# Patient Record
Sex: FEMALE | Race: OTHER | NOT HISPANIC OR LATINO
[De-identification: names, ages, dates, MRNs, and addresses within clinical notes are randomized per-mention and may not be internally consistent; named-entity substitution may affect disease eponyms.]

---

## 2018-12-03 PROBLEM — Z00.00 ENCOUNTER FOR PREVENTIVE HEALTH EXAMINATION: Status: ACTIVE | Noted: 2018-12-03

## 2021-08-20 ENCOUNTER — RESULT REVIEW (OUTPATIENT)
Age: 56
End: 2021-08-20

## 2022-12-20 ENCOUNTER — APPOINTMENT (OUTPATIENT)
Age: 57
End: 2022-12-20

## 2022-12-20 DIAGNOSIS — N92.1 EXCESSIVE AND FREQUENT MENSTRUATION WITH IRREGULAR CYCLE: ICD-10-CM

## 2022-12-20 DIAGNOSIS — Z78.9 OTHER SPECIFIED HEALTH STATUS: ICD-10-CM

## 2022-12-20 DIAGNOSIS — M54.50 LOW BACK PAIN, UNSPECIFIED: ICD-10-CM

## 2022-12-20 DIAGNOSIS — R93.5 ABNORMAL FINDINGS ON DIAGNOSTIC IMAGING OF OTHER ABDOMINAL REGIONS, INCLUDING RETROPERITONEUM: ICD-10-CM

## 2022-12-20 DIAGNOSIS — N13.30 UNSPECIFIED HYDRONEPHROSIS: ICD-10-CM

## 2022-12-20 DIAGNOSIS — R35.0 FREQUENCY OF MICTURITION: ICD-10-CM

## 2022-12-20 DIAGNOSIS — D64.9 ANEMIA, UNSPECIFIED: ICD-10-CM

## 2022-12-20 DIAGNOSIS — N39.3 STRESS INCONTINENCE (FEMALE) (MALE): ICD-10-CM

## 2022-12-20 DIAGNOSIS — J30.2 OTHER SEASONAL ALLERGIC RHINITIS: ICD-10-CM

## 2022-12-20 PROCEDURE — 99204 OFFICE O/P NEW MOD 45 MIN: CPT | Mod: 95

## 2022-12-20 RX ORDER — CHLORHEXIDINE GLUCONATE 4 %
325 (65 FE) LIQUID (ML) TOPICAL
Refills: 0 | Status: ACTIVE | COMMUNITY

## 2022-12-20 RX ORDER — TURMERIC ROOT EXTRACT 500 MG
TABLET ORAL
Refills: 0 | Status: ACTIVE | COMMUNITY

## 2022-12-20 RX ORDER — OMEGA-3/DHA/EPA/FISH OIL 300-1000MG
CAPSULE ORAL
Refills: 0 | Status: ACTIVE | COMMUNITY

## 2022-12-27 ENCOUNTER — OUTPATIENT (OUTPATIENT)
Dept: OUTPATIENT SERVICES | Facility: HOSPITAL | Age: 57
LOS: 1 days | End: 2022-12-27

## 2022-12-27 VITALS
TEMPERATURE: 98 F | RESPIRATION RATE: 16 BRPM | OXYGEN SATURATION: 100 % | HEIGHT: 62.5 IN | DIASTOLIC BLOOD PRESSURE: 85 MMHG | WEIGHT: 186.07 LBS | SYSTOLIC BLOOD PRESSURE: 126 MMHG | HEART RATE: 67 BPM

## 2022-12-27 DIAGNOSIS — Z98.890 OTHER SPECIFIED POSTPROCEDURAL STATES: Chronic | ICD-10-CM

## 2022-12-27 DIAGNOSIS — D25.1 INTRAMURAL LEIOMYOMA OF UTERUS: ICD-10-CM

## 2022-12-27 DIAGNOSIS — Z92.89 PERSONAL HISTORY OF OTHER MEDICAL TREATMENT: Chronic | ICD-10-CM

## 2022-12-27 DIAGNOSIS — D25.9 LEIOMYOMA OF UTERUS, UNSPECIFIED: ICD-10-CM

## 2022-12-27 LAB
ANION GAP SERPL CALC-SCNC: 11 MMOL/L — SIGNIFICANT CHANGE UP (ref 7–14)
BUN SERPL-MCNC: 12 MG/DL — SIGNIFICANT CHANGE UP (ref 7–23)
CALCIUM SERPL-MCNC: 9.3 MG/DL — SIGNIFICANT CHANGE UP (ref 8.4–10.5)
CHLORIDE SERPL-SCNC: 107 MMOL/L — SIGNIFICANT CHANGE UP (ref 98–107)
CO2 SERPL-SCNC: 25 MMOL/L — SIGNIFICANT CHANGE UP (ref 22–31)
CREAT SERPL-MCNC: 0.68 MG/DL — SIGNIFICANT CHANGE UP (ref 0.5–1.3)
EGFR: 103 ML/MIN/1.73M2 — SIGNIFICANT CHANGE UP
GLUCOSE SERPL-MCNC: 82 MG/DL — SIGNIFICANT CHANGE UP (ref 70–99)
HCG SERPL-ACNC: <5 MIU/ML — SIGNIFICANT CHANGE UP
HCT VFR BLD CALC: 34.2 % — LOW (ref 34.5–45)
HGB BLD-MCNC: 9.9 G/DL — LOW (ref 11.5–15.5)
MCHC RBC-ENTMCNC: 22.1 PG — LOW (ref 27–34)
MCHC RBC-ENTMCNC: 28.9 GM/DL — LOW (ref 32–36)
MCV RBC AUTO: 76.3 FL — LOW (ref 80–100)
NRBC # BLD: 0 /100 WBCS — SIGNIFICANT CHANGE UP (ref 0–0)
NRBC # FLD: 0 K/UL — SIGNIFICANT CHANGE UP (ref 0–0)
PLATELET # BLD AUTO: 316 K/UL — SIGNIFICANT CHANGE UP (ref 150–400)
POTASSIUM SERPL-MCNC: 4.8 MMOL/L — SIGNIFICANT CHANGE UP (ref 3.5–5.3)
POTASSIUM SERPL-SCNC: 4.8 MMOL/L — SIGNIFICANT CHANGE UP (ref 3.5–5.3)
RBC # BLD: 4.48 M/UL — SIGNIFICANT CHANGE UP (ref 3.8–5.2)
RBC # FLD: 16.6 % — HIGH (ref 10.3–14.5)
SODIUM SERPL-SCNC: 143 MMOL/L — SIGNIFICANT CHANGE UP (ref 135–145)
WBC # BLD: 2.75 K/UL — LOW (ref 3.8–10.5)
WBC # FLD AUTO: 2.75 K/UL — LOW (ref 3.8–10.5)

## 2022-12-27 RX ORDER — SODIUM CHLORIDE 9 MG/ML
1000 INJECTION, SOLUTION INTRAVENOUS
Refills: 0 | Status: DISCONTINUED | OUTPATIENT
Start: 2023-01-04 | End: 2023-01-18

## 2022-12-27 NOTE — H&P PST ADULT - GENITOURINARY COMMENTS
hx of fibroids, abnormal vaginal bleeding between menstruation Dx with intramural leiomyoma of uterus. Pt scheduled for D&C Hysteroscopy with myosure not examined

## 2022-12-27 NOTE — H&P PST ADULT - WILL THE PATIENT ACCEPT THE PFIZER COVID-19 VACCINE IF ELIGIBLE AND IT IS AVAILABLE?
June 7, 2022       Jean Pierre Lowry MD  [address] [facility] [address]  Via In Basket      Patient: Patsy Urias   YOB: 1981   Date of Visit: 6/7/2022       Dear Dr. Lowry:    Thank you for referring Patsy Urias to me for evaluation. Below are my notes for this visit with her.    If you have questions, please do not hesitate to call me. I look forward to following your patient along with you.      Sincerely,        Desi Sams CNP        CC: No Recipients  Desi Sams CNP  6/7/2022 12:15 PM  Signed         SLEEP EVALUATION NOTE     Patsy Urias is a 40 year old female presenting for evaluation of: Office Visit and Consultation   .    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 Pt indicates that her PCP is trying to rule out many causes for her symptoms and sleep apnea was on the list. She indicates a history of PTSD, premenstrual mood disorder, and post partum depression. She also indicates that her  tells her she goes through a 3 part breathing pattern throughout the night. Stage one includes breathing heavily (like Darth Lincoln), stage two includes stopping breathing, and stage three includes a dripping sound (like a water faucet). Pt does indicate that she has nasal issues and it could be post nasal drip that her  is hearing.    Furthermore, pt indicates that her dad had events of witnessed apneas as well as dyspneic arousals and her mom indicated that the pt as a child would have similar events but did not think anything of it. She also indicates that her mom states snoring but her  denies. She also has vivid dreams and wakes up having panic attacks. She needs to feel comforted at night and will sleep with a pillow over her head when her anxiety is really bad.       Bedtime: typically around 2200/2230. She will  however fall asleep around 1900 when she puts her daughter down to sleep and will then wake up around 2000/2100 and finish her tasks until about 2200/2230.   Awake time: 0600/0630 during school year and summer around 0700/0800.  Sleep position: bilateral sides and likes pillow over the head - linked to anxiety   Sleep onset latency: within 20-30 min   Sleeping aid medications: No  Awakenings # and episodes of nocturia: 1-3x - nocturia and no difficulty returning to sleep.     [x]Snoring  [x]Witnessed apneas  [x]Dyspneic arousal - has in the past not sure if it was related to a panic attack.   [x]Morning dry mouth - occasional   []Morning headache    [x]Non-restorative sleep  [x]Excessive daytime sleepiness or tired during the day  [x]Naps - occasional     []Urge to move legs and/or uncomfortable tingling or burning in legs  []Cramping or jerking of the legs during sleep    []Cataplexy  []Sleep paralysis  []Hypnogognic or hypnopompnic hallucinations  []Sleep attacks    []Parasomnias:    STOP BANG SCREENING  STOP  S: Do you snore loudly?: Yes  T: Do you often feel tired, fatigued or sleepy during daytime?: Yes  O: Has anyone observed you stop breathing during your sleep?: Yes  P: Do you have or are you being treated for high blood pressure?: No  BANG  B: BMI more than 35 kg/m2?: No  A: Is age over 50 years old?: No  N: Neck circumfrence >16 inches (40 cm)?: No  G: Is gender Male?: No  Total Score  Stop Bang Total Score (out of 8): 3    PRIOR SLEEP STUDY DATE:   RESULTS: 1. AHI:                   2. DESATURATION GALILEA:                 CURRENT CO-MORBIDITIES:    [] HTN     [] AF/ARRHYTHMIA    [] HYPOTHYROIDISM     [] CAD     [] OBESITY                  [] INSOMNIA  [] CHF     [] DM                         [] STROKE/TIA  [x]OTHER: palpitations, seasonal allergic rhinitis, Vitamin D deficiency, IBS, h/o migraine, hypersomnia, asthma         Sheldon Sleepiness Scale:     0 = would never doze  1 = slight chance of dozing  2  = moderate chance of dozing  3 = high chance of dozing    Situation     Chance of Dozing       1. Sitting and reading   1  2. Watching TV    2  3. Sitting, inactive in a public place       (e.g. a theatre or a meeting)  0    4. As a passenger in a car for an hour       without a break    1  5. Lying down to rest in the afternoon      when circumstances permit  3  6. Sitting and talking to someone  0    7. Sitting quietly after lunch without      Alcohol     0    8. In a car, while stopped for a few      Minutes in traffic    0            TOTAL: 7      Past Medical History:     Past Medical History:   Diagnosis Date   • Allergy     seasonal   • IBS (irritable bowel syndrome)    • RAD (reactive airway disease)     seasonal asthma   • TMJ tenderness      Past Surgical History:   Procedure Laterality Date   •  section, low transverse     • Myomectomy     • Tonsillectomy       History reviewed. No pertinent family history.  Social History     Tobacco Use   • Smoking status: Never Smoker   • Smokeless tobacco: Never Used   Vaping Use   • Vaping Use: never used   Substance Use Topics   • Alcohol use: Never   • Drug use: Never       ALLERGIES:   Allergen Reactions   • Albuterol Palpitations     Anxiety,dizzy,nausea   • Naproxen Other (See Comments)     abd pain     Current Outpatient Medications   Medication Sig   • escitalopram (LEXAPRO) 20 MG tablet TAKE 1 TABLET BY MOUTH DAILY   • Breo Ellipta 200-25 MCG/INH inhaler INHALE 1 PUFF INTO THE LUNGS DAILY   • Multiple Vitamins-Minerals (MULTIVITAMIN ADULT EXTRA C PO) multivitamin   • levalbuterol (Xopenex HFA) 45 MCG/ACT inhaler Inhale 1-2 puffs into the lungs every 6 hours as needed for Wheezing or Shortness of Breath.   • Cholecalciferol (VITAMIN D) 2000 units capsule Take 2,000 Units by mouth daily.   • Calcium Carb-Cholecalciferol (calcium carbonate-cholecalciferol) 500-100 MG-UNIT chewable tablet Calcium 500   • dicyclomine (BENTYL) 20 MG tablet TAKE 1 TABLET BY  MOUTH FOUR TIMES DAILY AS NEEDED FOR STOMACH CRAMPS   • loratadine (CLARITIN) 10 MG tablet Take 10 mg by mouth daily.     No current facility-administered medications for this visit.          Review of Systems:     Review of Systems   Constitutional: Negative.   HENT: Negative.    Eyes: Negative.    Cardiovascular: Negative.    Respiratory: Positive for sleep disturbances due to breathing and snoring.    Endocrine: Negative.    Hematologic/Lymphatic: Negative.    Skin: Negative.    Musculoskeletal: Negative.    Gastrointestinal: Negative.    Genitourinary: Positive for nocturia.   Neurological: Positive for excessive daytime sleepiness.   Psychiatric/Behavioral: Negative.    Allergic/Immunologic: Negative.         Physical Examination:     Vitals:    06/07/22 1132   BP: 103/65   Pulse: 75   Resp: 18   Temp: 96.8 °F (36 °C)   SpO2: 99%   Weight: 70.3 kg (155 lb)   Height: 5' 9\" (1.753 m)   LMP: 11/10/2021     Body mass index is 22.89 kg/m².      Neck Circumference: 12.75 in  Nasal Passages: [x]Clear   [] Congested  Palate: Velazquez [] I  [] II   [x] III   [] IV              Eyrtherna/edema [x]none []+1  []+2  []+3  []+4    Gen: No acute distress. Pleasant and interactive.  Head:  Normocephalic and atraumatic.  Eyes: Conjunctiva and sclera normal.   Lungs:  Normal respiratory rate and effort.  Extremities:  No edema in lower extremities.   Skin: Warm and dry, no rash.  Musculoskeletal:  No joint swelling or tenderness  Psych:  Affect was appropriate to situation and mood was normal.  Neuro:  Alert and oriented, attention and recall preserved, cranial nerves intact, motor strength preserved, coordination intact, gait normal.     Assessment and Plan:     PROBLEMS ADDRESSED THIS VISIT:  Problem List Items Addressed This Visit        Sleep    Hypersomnia - Primary    Relevant Orders    SLEEP STUDY HOME 4 CHANNEL PORTABLE UNATTENDED           Comments: Recommended a split night but d/t patient's anxiety as well as needing  to put daughter down for bed, it was determined that a HST would be better. We reviewed what a HST entails and the steps post HST. Pt is aware that she will be notified with her results and we reviewed treatment options.     PLAN & Patient Counseling:     • We reviewed the nature of sleep apnea, the elevated cardiovascular risks and other health consequences associated with this condition and reviewed treatment options in detail.  • Pt to undergo HST - no contraindications to HST. Proceed with titration if AHI >15.   • The patient was cautioned not to drive while sleepy.  • Anticipated follow up visit 4-6 weeks after beginning CPAP therapy.    Desi Sams, CNP                  No

## 2022-12-27 NOTE — H&P PST ADULT - PROBLEM SELECTOR PLAN 1
D&C Hysteroscopy with myosure 1/4/23    CBC BMP HCG    Preop instructions and preop covid testing protocol reviewed with pt

## 2022-12-27 NOTE — H&P PST ADULT - NSICDXPASTMEDICALHX_GEN_ALL_CORE_FT
PAST MEDICAL HISTORY:  Anemia     History of Obesity     Uterine Leiomyoma      PAST MEDICAL HISTORY:  Anemia     Breast calcifications     History of Obesity     Uterine Leiomyoma

## 2022-12-27 NOTE — H&P PST ADULT - HISTORY OF PRESENT ILLNESS
58 y/o female with hx of fibroids, abnormal vaginal bleeding between menstruation Dx with intramural leiomyoma of uterus. Pt scheduled for D&C Hysteroscopy with myosure 58 y/o female with hx of fibroids, abnormal vaginal bleeding between menstruation Dx with intramural leiomyoma of uterus.  Pt scheduled for D&C Hysteroscopy with myosure

## 2022-12-27 NOTE — H&P PST ADULT - ASSESSMENT
58 y/o female with hx of fibroids, abnormal vaginal bleeding between menstruation Dx with intramural leiomyoma of uterus.  Pt scheduled for D&C Hysteroscopy with myosure

## 2022-12-27 NOTE — H&P PST ADULT - OPHTHALMOLOGIC COMMENTS
reading glasses; family h of glaucoma.  Pt reports she on  latanoprost prophylactically as her eye pressure was elevated

## 2022-12-27 NOTE — H&P PST ADULT - NSICDXPASTSURGICALHX_GEN_ALL_CORE_FT
PAST SURGICAL HISTORY:  History of Abdominal Myomectomy In 1997     PAST SURGICAL HISTORY:  H/O colonoscopy     H/O myomectomy     History of Abdominal Myomectomy In 1997    History of dental surgery     Status post ORIF of fracture of ankle

## 2022-12-29 ENCOUNTER — NON-APPOINTMENT (OUTPATIENT)
Age: 57
End: 2022-12-29

## 2023-01-03 ENCOUNTER — TRANSCRIPTION ENCOUNTER (OUTPATIENT)
Age: 58
End: 2023-01-03

## 2023-01-03 NOTE — ASU PATIENT PROFILE, ADULT - NSICDXPASTSURGICALHX_GEN_ALL_CORE_FT
PAST SURGICAL HISTORY:  H/O colonoscopy     H/O myomectomy     History of Abdominal Myomectomy In 1997    History of dental surgery     Status post ORIF of fracture of ankle

## 2023-01-03 NOTE — ASU PATIENT PROFILE, ADULT - NSICDXPASTMEDICALHX_GEN_ALL_CORE_FT
PAST MEDICAL HISTORY:  Anemia     Breast calcifications     History of Obesity     Uterine Leiomyoma

## 2023-01-03 NOTE — ASU PATIENT PROFILE, ADULT - FALL HARM RISK - UNIVERSAL INTERVENTIONS
Bed in lowest position, wheels locked, appropriate side rails in place/Call bell, personal items and telephone in reach/Instruct patient to call for assistance before getting out of bed or chair/Non-slip footwear when patient is out of bed/[place] to call system/Physically safe environment - no spills, clutter or unnecessary equipment/Purposeful Proactive Rounding/Room/bathroom lighting operational, light cord in reach

## 2023-01-04 ENCOUNTER — OUTPATIENT (OUTPATIENT)
Dept: OUTPATIENT SERVICES | Facility: HOSPITAL | Age: 58
LOS: 1 days | Discharge: HOME | End: 2023-01-04
Payer: COMMERCIAL

## 2023-01-04 ENCOUNTER — TRANSCRIPTION ENCOUNTER (OUTPATIENT)
Age: 58
End: 2023-01-04

## 2023-01-04 VITALS
OXYGEN SATURATION: 100 % | WEIGHT: 186.07 LBS | HEIGHT: 62.5 IN | RESPIRATION RATE: 18 BRPM | TEMPERATURE: 98 F | DIASTOLIC BLOOD PRESSURE: 64 MMHG | HEART RATE: 70 BPM | SYSTOLIC BLOOD PRESSURE: 119 MMHG

## 2023-01-04 VITALS
HEART RATE: 72 BPM | OXYGEN SATURATION: 99 % | RESPIRATION RATE: 14 BRPM | SYSTOLIC BLOOD PRESSURE: 137 MMHG | DIASTOLIC BLOOD PRESSURE: 65 MMHG

## 2023-01-04 DIAGNOSIS — Z98.890 OTHER SPECIFIED POSTPROCEDURAL STATES: Chronic | ICD-10-CM

## 2023-01-04 DIAGNOSIS — D25.1 INTRAMURAL LEIOMYOMA OF UTERUS: ICD-10-CM

## 2023-01-04 DIAGNOSIS — Z92.89 PERSONAL HISTORY OF OTHER MEDICAL TREATMENT: Chronic | ICD-10-CM

## 2023-01-04 LAB — HCG UR QL: NEGATIVE — SIGNIFICANT CHANGE UP

## 2023-01-04 PROCEDURE — 88305 TISSUE EXAM BY PATHOLOGIST: CPT | Mod: 26

## 2023-01-04 DEVICE — MYOSURE TISSUE REMOVAL DEVICE REACH
Type: IMPLANTABLE DEVICE | Status: NON-FUNCTIONAL
Removed: 2023-01-04

## 2023-01-04 DEVICE — MYOSURE TISSUE REMOVAL FMS FOR FLUENT XL
Type: IMPLANTABLE DEVICE | Status: NON-FUNCTIONAL
Removed: 2023-01-04

## 2023-01-04 RX ORDER — SODIUM CHLORIDE 9 MG/ML
1000 INJECTION, SOLUTION INTRAVENOUS
Refills: 0 | Status: DISCONTINUED | OUTPATIENT
Start: 2023-01-04 | End: 2023-01-18

## 2023-01-04 NOTE — ASU DISCHARGE PLAN (ADULT/PEDIATRIC) - NS MD DC FALL RISK RISK
For information on Fall & Injury Prevention, visit: [email or web address] OR  [email or web address] OR  https://www.cdc.gov/steadi/patient.html

## 2023-01-04 NOTE — ASU DISCHARGE PLAN (ADULT/PEDIATRIC) - NURSING INSTRUCTIONS
You were given intravenous TYLENOL for pain management at  10.15 am Please DO NOT take any products containing TYLENOL or ACETAMINOPHEN, such as VICODIN, PERCOCET, EXCEDRIN, and any over-the-counter cold medication for the next 6 hours (until  4.15 pm DO NOT TAKE MORE THAN 3000 MG OF TYLENOL in a 24 hour period. You were given intravenous TORADOL for pain management at  10.30 am Please DO NOT take ibuprofen containing products such as MOTRIN or ADVIL, or any other NSAIDs (Non-Steroidal Anti-Inflammatory Drugs) such as ALEVE for the next 6 hours (until  4.30 pm

## 2023-01-04 NOTE — ASU DISCHARGE PLAN (ADULT/PEDIATRIC) - CARE PROVIDER_API CALL
Rashmi Palomo  OBSTETRICS AND GYNECOLOGY  [address], [address]  [place] [address]  Phone: [phone]  Fax: [phone]  Follow Up Time: 2 weeks

## 2023-01-04 NOTE — ASU DISCHARGE PLAN (ADULT/PEDIATRIC) - HAVE YOU HAD COVID IN THE LAST 60 DAYS?
Reason For Visit  DAVID MCGHEE is here today for a nurse visit for x-ray.      Current Meds   1. FLUoxetine HCl - 20 MG Oral Capsule; TAKE 1 CAPSULE BY MOUTH EVERY DAY;   Therapy: 19Mar2018 to (Evaluate:18Apr2018)  Requested for: 19Mar2018; Last   Rx:19Mar2018 Ordered   2. FLUoxetine HCl - 20 MG Oral Tablet; 1 tab po q day x 30 days;   Therapy: 15Jan2018 to (Last Rx:15Jan2018)  Requested for: 15Jan2018; Status: ACTIVE   - Transmit to Pharmacy - Awaiting Verification Ordered    Allergies  No Known Drug Allergies    Vitals  Vital Signs    Recorded: 29Mar2018 10:19AM   Height 5 ft 8.11 in   Weight 261 lb 5 oz   BMI Calculated 39.6   BSA Calculated 2.29   BMI Percentile 99   2-20 Stature Percentile 99 %   2-20 Weight Percentile 99 %   Systolic 118   Diastolic 80   Temperature 97.4 F   Heart Rate 74   Respiration 20     Plan    Patient Instructions Right ankle x-ray  Ordered by Dr. Kajal Husain  Performed by Elina Edward RT(R)  Assessment: arthralgia of right ankle.      Signatures   Electronically signed by : Elina Edward CMA; Mar 29 2018 12:50PM CST     No

## 2023-01-04 NOTE — ASU PREOP CHECKLIST - AS BP NONINV METHOD
Lazara Fowler  is a 29 year old  year old single  G 1 P 0who presents to the clinic for an new ob visit.    Estimated Date of Delivery: Aug 21, 2022  is calculated from Patient's last menstrual period was Patient's last menstrual period was 11/14/2021.   She has not had bleeding since her LMP.   She has had mild nausea. Weigh loss has not occurred.   This was not a planned pregnancy.   FOB is involved,  Mert   OTHER CONCERNS: hx of high Blood pressure at home over the last 6 months  INFECTION HISTORY  HIV: no  Hepatitis B: no  Hepatitis C: no  Syphilis:  no  Tuberculosis: no   PPD- no  Herpes self: no  Herpes partner:  no  Chlamydia:  no  Gonorrhea:  no  HPV: no  BV:  no  Trichomonis:  no  Chicken Pox:  YES  Past Medical History:   Diagnosis Date     ADD (attention deficit disorder)      ADHD (attention deficit hyperactivity disorder)      Anxiety      Depression        Past Surgical History:   Procedure Laterality Date     TONSILLECTOMY  age 6       ====================================================  PERSONAL/SOCIAL HISTORY  Lives with partner.  Employment: Full time.  Her job involves light activity .  Additional items: None  =====================================================   REVIEW OF SYSTEMS  CONSTITUTIONAL: NEGATIVE for fever, chills  INTEGUMENTARY/SKIN: NEGATIVE for worrisome rashes, moles or lesions  EYES: NEGATIVE for vision changes   ENT/MOUTH: NEGATIVE for ear, mouth and throat problems  RESP: NEGATIVE for significant cough or SOB  BREAST: NEGATIVE for masses, tenderness or discharge  CV: NEGATIVE for chest pain, palpitations   GI: NEGATIVE for nausea, abdominal pain, heartburn, or change in bowel habits  : NEGATIVE for frequency, dysuria, or hematuria  MUSCULOSKELETAL: NEGATIVE for significant arthralgias or myalgia  NEURO: NEGATIVE for weakness, dizziness or paresthesias or headache  ENDOCRINE: NEGATIVE for temperature intolerance, skin/hair changes  HEME: NEGATIVE for bleeding  "problems  PSYCHIATRIC: NEGATIVE for changes in mood or affect  C: NEGATIVE for fever, chills  E: NEGATIVE for vision changes   R: NEGATIVE for significant cough or SOB  M: NEGATIVE for significant arthralgias or myalgia  N: NEGATIVE for weakness, dizziness or paresthesias or headache  ====================================================  PHYSICAL EXAM: Vitals: /87 (BP Location: Left arm, Patient Position: Chair, Cuff Size: Adult Regular)   Pulse 108   Temp 98.5  F (36.9  C) (Tympanic)   Resp 18   Ht 1.511 m (4' 11.5\")   Wt 80.5 kg (177 lb 6.4 oz)   LMP 11/14/2021   BMI 35.23 kg/m    BMI= Body mass index is 35.23 kg/m .      RECOMMENDED WEIGHT GAIN: 15-25 lbs.  GENERAL:  Pleasant pregnant female, alert, well groomed.  SKIN:  Warm and dry, without lesions or rashes  HEAD: Symmetrical features.  EYES:  PERRLA,   MOUTH:  Buccal mucosa pink, moist without lesions.    NECK:  Thyroid without enlargement and nodules.  Lymph nodes not palpable.   LUNGS:  Clear to auscultation.  BREAST:  Symmetrical.  No dominant, fixed or suspicious masses are noted.  No skin or nipple changes or axillary nodes.  Nipples everted.      HEART:  RRR without murmur.  ABDOMEN: Soft without masses , tenderness or organomegaly.  No CVA tenderness. No scars noted..   MUSCULOSKELETAL:  Full range of motion  EXTREMITIES:  No edema. No significant varicosities.   GENITALIA:  BUS WNL, no lesions noted   VAGINA:  Pink, normal rugae and discharge normal and physiologic,   CERVIX:  smooth, without discharge or CMT and nulliparous os,   firm/ closed 4 cm long.  UTERUS: Anteverted, nontender 9 weeks in size.  ADNEXA: Without masses or tenderness  PELVIS:  Arch; wide . Sacrum; deep. Spines;blunt.  Side walls; straight. Type; gynecoid  PELVIS:   Adequate, Pelvis proven to -pelvis not tested.  RECTAL:  Normal appearance.  Digital exam deferred.  WET PREP:Not done  DTR 3+/4+LE    Ankle clonus 3 beats on R, 2 Beats on the  " Left  =========================================  ASSESSMENT:  (Z34.01) Encounter for prenatal care in first trimester of first pregnancy  (primary encounter diagnosis)  Comment: Estimated Date of Delivery: Aug 21, 2022    Plan: US OB <14 Weeks w Transvaginal Single, ABO/Rh         type and screen, CBC with platelets, Hepatitis         B surface antigen, Rubella Antibody IgG, HIV         Antigen Antibody Combo, Treponema Abs w Reflex         to RPR and Titer, Hepatitis C Screen Reflex to         HCV RNA Quant and Genotype, UA with         Microscopic, Urine Culture, Chlamydia         trachomatis/Neisseria gonorrhoeae by PCR, Pap         screen reflex to HPV if ASCUS - recommend age         25 - 29, AST, ALT, Protein  random urine, Uric         acid, Creatinine    (R03.0) Elevated blood pressure reading without diagnosis of hypertension  Comment: notes increase in BP at home over the last 6 half of 2021  Plan: she should bring in her cuff next visit to certify that her cuff is accurate    (Z72.0) Tobacco abuse  Comment: cutting down to 1/2 ppd  Plan: decrease further    (Z23) Need for vaccination  Comment: covid unvaccinated  Plan: encourage Covid vaccine        PREGNANCY AT RISK? no  ==========================================      PLAN:  Discussed physician coverage, tertiary support, diet, exercise, weight gain, schedule of visits, routine and indicated ultrasounds, childbirth education and antepartum testing for certain birth defects.  Encouraged patient to review contents of Prenatal Breastfeeding Education Toolkit. Offered opportunity to answer questions regarding the importance of skin to skin contact, early initiation of exclusive breastfeeding for the first six months and rooming in while in the hospital.  Discussed CDC travel advisory for Zika virus.  Syphilis is a sexually transmitted disease that can cause birth defects in the babies of untreated mothers. Every pregnant patient is tested for syphilis early  in each pregnancy as part of the routine lab work. The [place] Department of Health has seen an increase in the rate of syphilis in [place]. The [facility] now recommends testing for syphilis 3 times during a pregnancy, the new prenatal visit, 28 weeks and when admitted for delivery    Instructed on use of triage nurse line and contacting the on call Birthplace after hours for an urgent need such as fever, vagina bleeding, bladder or vaginal infection, rupture of membranes,  or term labor.    Discussed the indications, uses for and false positives for quad screen, nuchal translucency and fetal survey ultrasound at 18-20 weeks gestation. Will inform us at the next visit if she wished to avail herself of these screens.  Instructed on best evidence for: weight gain for her BMI for pregnancy; healthy diet and foods to avoid; exercise and activity during pregnancy;avoiding exposure to toxoplasmosis; and maintenance of a generally healthy lifestyle.   Discussed the harms, benefits, side effects and alternative therapies for current prescribed and OTC medications.      Zuhair Cain MD       electronic

## 2023-01-11 LAB — SURGICAL PATHOLOGY STUDY: SIGNIFICANT CHANGE UP

## 2023-07-21 PROBLEM — R92.1 MAMMOGRAPHIC CALCIFICATION FOUND ON DIAGNOSTIC IMAGING OF BREAST: Chronic | Status: ACTIVE | Noted: 2022-12-27

## 2023-07-25 ENCOUNTER — OUTPATIENT (OUTPATIENT)
Dept: OUTPATIENT SERVICES | Facility: HOSPITAL | Age: 58
LOS: 1 days | End: 2023-07-25

## 2023-07-25 VITALS
HEART RATE: 67 BPM | HEIGHT: 62 IN | SYSTOLIC BLOOD PRESSURE: 108 MMHG | WEIGHT: 203.93 LBS | DIASTOLIC BLOOD PRESSURE: 62 MMHG | OXYGEN SATURATION: 100 % | TEMPERATURE: 99 F | RESPIRATION RATE: 18 BRPM

## 2023-07-25 DIAGNOSIS — Z98.890 OTHER SPECIFIED POSTPROCEDURAL STATES: Chronic | ICD-10-CM

## 2023-07-25 DIAGNOSIS — D25.9 LEIOMYOMA OF UTERUS, UNSPECIFIED: ICD-10-CM

## 2023-07-25 DIAGNOSIS — Z92.89 PERSONAL HISTORY OF OTHER MEDICAL TREATMENT: Chronic | ICD-10-CM

## 2023-07-25 LAB
A1C WITH ESTIMATED AVERAGE GLUCOSE RESULT: 6 % — HIGH (ref 4–5.6)
ANION GAP SERPL CALC-SCNC: 14 MMOL/L — SIGNIFICANT CHANGE UP (ref 7–14)
BLD GP AB SCN SERPL QL: NEGATIVE — SIGNIFICANT CHANGE UP
BUN SERPL-MCNC: 18 MG/DL — SIGNIFICANT CHANGE UP (ref 7–23)
CALCIUM SERPL-MCNC: 9.6 MG/DL — SIGNIFICANT CHANGE UP (ref 8.4–10.5)
CHLORIDE SERPL-SCNC: 101 MMOL/L — SIGNIFICANT CHANGE UP (ref 98–107)
CO2 SERPL-SCNC: 24 MMOL/L — SIGNIFICANT CHANGE UP (ref 22–31)
CREAT SERPL-MCNC: 0.78 MG/DL — SIGNIFICANT CHANGE UP (ref 0.5–1.3)
EGFR: 89 ML/MIN/1.73M2 — SIGNIFICANT CHANGE UP
ESTIMATED AVERAGE GLUCOSE: 126 — SIGNIFICANT CHANGE UP
GLUCOSE SERPL-MCNC: 77 MG/DL — SIGNIFICANT CHANGE UP (ref 70–99)
HCG SERPL-ACNC: <1 MIU/ML — SIGNIFICANT CHANGE UP
HCT VFR BLD CALC: 35.8 % — SIGNIFICANT CHANGE UP (ref 34.5–45)
HGB BLD-MCNC: 10.9 G/DL — LOW (ref 11.5–15.5)
MCHC RBC-ENTMCNC: 24.3 PG — LOW (ref 27–34)
MCHC RBC-ENTMCNC: 30.4 GM/DL — LOW (ref 32–36)
MCV RBC AUTO: 79.9 FL — LOW (ref 80–100)
NRBC # BLD: 0 /100 WBCS — SIGNIFICANT CHANGE UP (ref 0–0)
NRBC # FLD: 0 K/UL — SIGNIFICANT CHANGE UP (ref 0–0)
PLATELET # BLD AUTO: 302 K/UL — SIGNIFICANT CHANGE UP (ref 150–400)
POTASSIUM SERPL-MCNC: 4.4 MMOL/L — SIGNIFICANT CHANGE UP (ref 3.5–5.3)
POTASSIUM SERPL-SCNC: 4.4 MMOL/L — SIGNIFICANT CHANGE UP (ref 3.5–5.3)
RBC # BLD: 4.48 M/UL — SIGNIFICANT CHANGE UP (ref 3.8–5.2)
RBC # FLD: 18.1 % — HIGH (ref 10.3–14.5)
RH IG SCN BLD-IMP: POSITIVE — SIGNIFICANT CHANGE UP
SODIUM SERPL-SCNC: 139 MMOL/L — SIGNIFICANT CHANGE UP (ref 135–145)
WBC # BLD: 4.17 K/UL — SIGNIFICANT CHANGE UP (ref 3.8–10.5)
WBC # FLD AUTO: 4.17 K/UL — SIGNIFICANT CHANGE UP (ref 3.8–10.5)

## 2023-07-25 RX ORDER — CHLORHEXIDINE GLUCONATE 213 G/1000ML
1 SOLUTION TOPICAL DAILY
Refills: 0 | Status: DISCONTINUED | OUTPATIENT
Start: 2023-08-04 | End: 2023-08-08

## 2023-07-25 RX ORDER — SODIUM CHLORIDE 9 MG/ML
1000 INJECTION, SOLUTION INTRAVENOUS
Refills: 0 | Status: DISCONTINUED | OUTPATIENT
Start: 2023-08-04 | End: 2023-08-06

## 2023-07-25 RX ORDER — ERGOCALCIFEROL 1.25 MG/1
1000 CAPSULE ORAL
Qty: 0 | Refills: 0 | DISCHARGE

## 2023-07-25 RX ORDER — FERROUS SULFATE 325(65) MG
65 TABLET ORAL
Qty: 0 | Refills: 0 | DISCHARGE

## 2023-07-25 NOTE — H&P PST ADULT - ATTENDING COMMENTS
Dw patient risk and benefit of IRIS/BS, bleeding, infection, damage to bowel, bladder, ureters and kidneys.  Increased risk of complication due to previous laparotomy and myomectomy.  Consent obtained.  ADELAIDA Palomo

## 2023-07-25 NOTE — H&P PST ADULT - OPHTHALMOLOGIC COMMENTS
reading glasses; family h of glaucoma.  Pt reports she on  latanoprost prophylactically as her eye pressure was elevated reading glasses; family history of glaucoma.  Pt reports she on  latanoprost prophylactically as her eye pressure was elevated

## 2023-07-25 NOTE — H&P PST ADULT - PATIENT ON (OXYGEN DELIVERY METHOD)
Date/Time of Note


Date/Time of Note


DATE: 11/18/17 


TIME: 19:44





ER Progress Note


I placed a call back to the patient today and patient reported that his 

symptoms are much better and that he would follow-up with his primary care 

doctor on Monday.  Patient was instructed return to the emergency department 

for any worsening of symptoms.  Patient understood.











YANICK HAAS PA-C Nov 18, 2017 19:45 room air

## 2023-07-25 NOTE — H&P PST ADULT - GENITOURINARY COMMENTS
not examined hx of fibroids, abnormal vaginal bleeding between menstruation Dx with intramural leiomyoma of uterus. Pt scheduled for D&C Hysteroscopy with myosure hx of fibroids, pt. states fibroid is compressing ureter. Pt scheduled for abdominal hysterectomy

## 2023-07-25 NOTE — H&P PST ADULT - NSICDXPASTSURGICALHX_GEN_ALL_CORE_FT
PAST SURGICAL HISTORY:  H/O colonoscopy     H/O myomectomy     History of Abdominal Myomectomy In 1997    History of D&C     History of dental surgery     Status post ORIF of fracture of ankle

## 2023-07-25 NOTE — H&P PST ADULT - HISTORY OF PRESENT ILLNESS
56 y/o female with hx of fibroids, abnormal vaginal bleeding between menstruation s/p D&C Hysteroscopy with myosure on  57 y/o female with hx of fibroids, abnormal vaginal bleeding between menstruation s/p D&C Hysteroscopy with myosure in June 2023 now scheduled for abdominal hysterectomy with Dr. Palomo due to fibroid compressing ureter.

## 2023-07-25 NOTE — H&P PST ADULT - PROBLEM SELECTOR PLAN 1
scheduled for abdominal hysterectomy with Dr. Palomo on 08/04/2023.  Verbal and written pre-op instructions provided to patient. Patient verbalized understanding and will call surgeons office for revised instructions if surgery is rescheduled.   Pepcid for GI prophylaxis provided.   patient given verbal and written instruction with teach back on chlorhexidine shampoo, and the patient verbalized understanding with return demonstration.   Urine pregnancy test DOS-Urine cup provided.

## 2023-08-03 ENCOUNTER — TRANSCRIPTION ENCOUNTER (OUTPATIENT)
Age: 58
End: 2023-08-03

## 2023-08-04 ENCOUNTER — TRANSCRIPTION ENCOUNTER (OUTPATIENT)
Age: 58
End: 2023-08-04

## 2023-08-04 ENCOUNTER — INPATIENT (INPATIENT)
Facility: HOSPITAL | Age: 58
LOS: 3 days | Discharge: HOME | End: 2023-08-08
Attending: OBSTETRICS & GYNECOLOGY | Admitting: OBSTETRICS & GYNECOLOGY
Payer: COMMERCIAL

## 2023-08-04 VITALS
DIASTOLIC BLOOD PRESSURE: 77 MMHG | TEMPERATURE: 99 F | WEIGHT: 203.93 LBS | HEIGHT: 62 IN | OXYGEN SATURATION: 100 % | RESPIRATION RATE: 18 BRPM | HEART RATE: 74 BPM | SYSTOLIC BLOOD PRESSURE: 111 MMHG

## 2023-08-04 DIAGNOSIS — Z98.890 OTHER SPECIFIED POSTPROCEDURAL STATES: Chronic | ICD-10-CM

## 2023-08-04 DIAGNOSIS — D25.9 LEIOMYOMA OF UTERUS, UNSPECIFIED: ICD-10-CM

## 2023-08-04 DIAGNOSIS — Z92.89 PERSONAL HISTORY OF OTHER MEDICAL TREATMENT: Chronic | ICD-10-CM

## 2023-08-04 LAB
HCG UR QL: NEGATIVE — SIGNIFICANT CHANGE UP
RH IG SCN BLD-IMP: POSITIVE — SIGNIFICANT CHANGE UP

## 2023-08-04 PROCEDURE — 45300 PROCTOSIGMOIDOSCOPY DX: CPT | Mod: GC

## 2023-08-04 PROCEDURE — 88307 TISSUE EXAM BY PATHOLOGIST: CPT | Mod: 26

## 2023-08-04 DEVICE — VISTASEAL FIBRIN HUMAN 10ML
Type: IMPLANTABLE DEVICE | Status: NON-FUNCTIONAL
Removed: 2023-08-04

## 2023-08-04 RX ORDER — OXYCODONE HYDROCHLORIDE 5 MG/1
5 TABLET ORAL
Refills: 0 | Status: DISCONTINUED | OUTPATIENT
Start: 2023-08-04 | End: 2023-08-08

## 2023-08-04 RX ORDER — SIMETHICONE 80 MG/1
80 TABLET, CHEWABLE ORAL EVERY 4 HOURS
Refills: 0 | Status: DISCONTINUED | OUTPATIENT
Start: 2023-08-04 | End: 2023-08-08

## 2023-08-04 RX ORDER — SENNA PLUS 8.6 MG/1
2 TABLET ORAL AT BEDTIME
Refills: 0 | Status: DISCONTINUED | OUTPATIENT
Start: 2023-08-04 | End: 2023-08-08

## 2023-08-04 RX ORDER — HEPARIN SODIUM 5000 [USP'U]/ML
5000 INJECTION INTRAVENOUS; SUBCUTANEOUS EVERY 12 HOURS
Refills: 0 | Status: DISCONTINUED | OUTPATIENT
Start: 2023-08-04 | End: 2023-08-08

## 2023-08-04 RX ORDER — ACETAMINOPHEN 500 MG
1000 TABLET ORAL ONCE
Refills: 0 | Status: COMPLETED | OUTPATIENT
Start: 2023-08-05 | End: 2023-08-05

## 2023-08-04 RX ORDER — HYDROMORPHONE HYDROCHLORIDE 2 MG/ML
0.5 INJECTION INTRAMUSCULAR; INTRAVENOUS; SUBCUTANEOUS
Refills: 0 | Status: DISCONTINUED | OUTPATIENT
Start: 2023-08-04 | End: 2023-08-04

## 2023-08-04 RX ORDER — HYDRALAZINE HCL 50 MG
5 TABLET ORAL ONCE
Refills: 0 | Status: COMPLETED | OUTPATIENT
Start: 2023-08-04 | End: 2023-08-04

## 2023-08-04 RX ORDER — ONDANSETRON 8 MG/1
4 TABLET, FILM COATED ORAL EVERY 6 HOURS
Refills: 0 | Status: DISCONTINUED | OUTPATIENT
Start: 2023-08-04 | End: 2023-08-08

## 2023-08-04 RX ORDER — ACETAMINOPHEN 500 MG
1000 TABLET ORAL ONCE
Refills: 0 | Status: DISCONTINUED | OUTPATIENT
Start: 2023-08-05 | End: 2023-08-05

## 2023-08-04 RX ORDER — OXYCODONE HYDROCHLORIDE 5 MG/1
5 TABLET ORAL ONCE
Refills: 0 | Status: DISCONTINUED | OUTPATIENT
Start: 2023-08-04 | End: 2023-08-04

## 2023-08-04 RX ORDER — ONDANSETRON 8 MG/1
4 TABLET, FILM COATED ORAL ONCE
Refills: 0 | Status: DISCONTINUED | OUTPATIENT
Start: 2023-08-04 | End: 2023-08-04

## 2023-08-04 RX ORDER — KETOROLAC TROMETHAMINE 30 MG/ML
30 SYRINGE (ML) INJECTION EVERY 6 HOURS
Refills: 0 | Status: DISCONTINUED | OUTPATIENT
Start: 2023-08-04 | End: 2023-08-05

## 2023-08-04 RX ORDER — SODIUM CHLORIDE 9 MG/ML
1000 INJECTION, SOLUTION INTRAVENOUS
Refills: 0 | Status: DISCONTINUED | OUTPATIENT
Start: 2023-08-04 | End: 2023-08-06

## 2023-08-04 RX ADMIN — CHLORHEXIDINE GLUCONATE 1 APPLICATION(S): 213 SOLUTION TOPICAL at 10:47

## 2023-08-04 RX ADMIN — OXYCODONE HYDROCHLORIDE 5 MILLIGRAM(S): 5 TABLET ORAL at 20:33

## 2023-08-04 RX ADMIN — SODIUM CHLORIDE 75 MILLILITER(S): 9 INJECTION, SOLUTION INTRAVENOUS at 23:19

## 2023-08-04 RX ADMIN — Medication 5 MILLIGRAM(S): at 20:52

## 2023-08-04 RX ADMIN — SODIUM CHLORIDE 30 MILLILITER(S): 9 INJECTION, SOLUTION INTRAVENOUS at 10:47

## 2023-08-04 RX ADMIN — OXYCODONE HYDROCHLORIDE 5 MILLIGRAM(S): 5 TABLET ORAL at 21:15

## 2023-08-04 NOTE — DISCHARGE NOTE PROVIDER - NPI NUMBER (FOR SYSADMIN USE ONLY) :
[[id number]] [

## 2023-08-04 NOTE — DISCHARGE NOTE PROVIDER - HOSPITAL COURSE
Patient was admitted for scheduled surgery and underwent ex-lap, ANITA, BS, DARIA. Surgery complicated by dense adhesions and gen surge consulted intra op to evaluate area of sigmoid colon thought to be affected by cautery, no defect noted. Patient was admitted for scheduled surgery and underwent ex-lap, ANITA, BS, DARIA. Surgery complicated by dense adhesions and gen surge consulted intra op to evaluate area of sigmoid colon thought to be affected by cautery, no defect noted.   Discharged on POD 2 in stable condition with normal labs and vitals. Patient was admitted for scheduled surgery and underwent ex-lap, ANITA, BS, DARIA. Surgery complicated by dense adhesions and gen surge consulted intra op to evaluate area of sigmoid colon thought to be affected by cautery, no defect noted.  Please see operative note for full details. Patient was transferred to recovery room in stable condition and was then eventually transferred to the floor.   EBL: 700. Hct: 35.8->28.7->27.8    On POD #1, Cummings catheter was discontinued and patient voided without issues. Patient's diet was advanced and she tolerated regular diet without issues.  POD#2 routine post-operative care was performed.  No notable events throughout day. Overnight patient had 600cc of nbnb emesis. Patient received enema with bowel movement shortly after which improved pt's sx of abdominal pain/nausea.         On POD#3 patient was deemed stable for discharge as she was meeting postoperative milestones - tolerating regular diet, ambulating without difficulty, voiding, and pain was well controlled on oral medications. Throughout admission, patient received prophylactic anticoagulation.  Patient was instructed to follow up with Dr. Palomo in 2 weeks. Patient was admitted for scheduled surgery and underwent ex-lap, ANITA, BS, DARIA. Surgery complicated by dense adhesions and gen surge consulted intra op to evaluate area of sigmoid colon thought to be affected by cautery, no defect noted.  Please see operative note for full details. Patient was transferred to recovery room in stable condition and was then eventually transferred to the floor.   EBL: 700. Hct: 35.8->28.7->27.8    On POD #1, Cummings catheter was discontinued and patient voided without issues. Patient's diet was advanced and she tolerated regular diet without issues.  POD#2 routine post-operative care was performed.  No notable events throughout day. Overnight patient had 600cc of nbnb emesis. Patient received enema with bowel movement shortly after which improved pt's sx of abdominal pain/nausea.   POD#3, no notable events.        On POD#4 patient was deemed stable for discharge as she was meeting postoperative milestones - tolerating regular diet, ambulating without difficulty, voiding, and pain was well controlled on oral medications. Throughout admission, patient received prophylactic anticoagulation.  Patient was instructed to follow up with Dr. Palomo in 2 weeks.

## 2023-08-04 NOTE — BRIEF OPERATIVE NOTE - NSICDXBRIEFPOSTOP_GEN_ALL_CORE_FT
POST-OP DIAGNOSIS:  Uterine leiomyoma 04-Aug-2023 18:13:14  Nguyen Bell  
POST-OP DIAGNOSIS:  Uterine leiomyoma 04-Aug-2023 18:13:14  Nguyen Bell

## 2023-08-04 NOTE — CONSULT NOTE ADULT - ASSESSMENT
ASSESSMENT/PLAN: 59 y/o female with hx of fibroids, abnormal vaginal bleeding between menstruation s/p D&C Hysteroscopy with myosure in June 2023, currently undergoing open abdominal hysterectomy in [facility] OR. General Surgery paged for intraoperative consult to rule out bowel injury.       - Surgical attending and resident OR to asses for bowel injury   - See Brief Op Note for further details    Discussed with attending, Dr. Jesse LYNN Team Surgery (Acute Care Surgery)  [id number]

## 2023-08-04 NOTE — BRIEF OPERATIVE NOTE - NSICDXBRIEFPROCEDURE_GEN_ALL_CORE_FT
PROCEDURES:  Intraoperative rigid sigmoidoscopy 04-Aug-2023 18:24:58  Nguyen Bell  
PROCEDURES:  Supracervical hysterectomy 04-Aug-2023 19:22:39  Opal Schmidt  Salpingectomy, total, bilateral 04-Aug-2023 19:23:24  Opal Schmidt  Laparoscopic lysis of adhesions of pelvis 04-Aug-2023 19:23:36  Opal Schmidt

## 2023-08-04 NOTE — CONSULT NOTE ADULT - ATTENDING COMMENTS
Intraop evaluation of bowel injury. Rigid sigmoidoscope negative for injury.  Will follow along    I have personally interviewed and examined this patient, reviewed pertinent labs and imaging, and discussed the case with colleagues, residents, and physician assistants on B Team rounds.    The active care issues are:  1. r/o colon injury    The Acute Care Surgery (B Team) Attending Group Practice:  Dr. Ramy Corley, Dr. Alexis Cordova, Dr. Negro Rooney, Dr. Fly Molina,     urgent issues - spectra [id number]  nonurgent issues - [phone]  patient appointments or afterhours - [phone]

## 2023-08-04 NOTE — DISCHARGE NOTE PROVIDER - NSDCFUADDINST_GEN_ALL_CORE_FT
Regular diet as tolerated, regular activity as tolerated, no heavy lifting for six weeks.  Nothing per vagina: no intercourse, tampons or douching.  Call your provider if you experience fevers, chills, worsening abdominal pain, inability to urinate or worsening vaginal bleeding.  Follow up with your provider in 2 weeks.

## 2023-08-04 NOTE — PATIENT PROFILE ADULT - FUNCTIONAL ASSESSMENT - DAILY ACTIVITY 5.
Continue following with endocrinology for repeat thyroid labs  
Uncontrolled at this time.  Would benefit from not only a continuous glucose monitor but a insulin pump.  Advise follow-up with endocrinology to discuss further  
Unresponsive to PPI.  Will refer to gastroenterology.  Check IgA levels.  Check allergy food panel.  Encourage Benefiber.  Follow-up after evaluation by gastroenterology  
3 = A little assistance

## 2023-08-04 NOTE — PATIENT PROFILE ADULT - FUNCTIONAL ASSESSMENT - BASIC MOBILITY 6.
3-calculated by average/Not able to assess (calculate score using [facility] averaging method)

## 2023-08-04 NOTE — CONSULT NOTE ADULT - SUBJECTIVE AND OBJECTIVE BOX
GENERAL SURGERY CONSULT NOTE  HPI: 59 y/o female with hx of fibroids, abnormal vaginal bleeding between menstruation s/p D&C Hysteroscopy with myosure in June 2023 now scheduled for abdominal hysterectomy with Dr. Palomo due to fibroid compressing ureter. Patient currently undergoing ope abdominal hysterectomy in [facility] OR. General Surgery paged for intraoperative consult to rule out bowel injury.         PAST MEDICAL & SURGICAL HISTORY:  Uterine Leiomyoma      Anemia      History of Obesity      Breast calcifications      History of Abdominal Myomectomy  In 1997      History of dental surgery      Status post ORIF of fracture of ankle      H/O colonoscopy      H/O myomectomy      History of D&C          MEDICATIONS  (STANDING):  chlorhexidine 2% Cloths 1 Application(s) Topical daily  lactated ringers. 1000 milliLiter(s) (30 mL/Hr) IV Continuous <Continuous>    MEDICATIONS  (PRN):      Allergies    morphine (Short breath)  penicillin (Other)    Intolerances        SOCIAL HISTORY:    FAMILY HISTORY:          Physical Exam:  General: NAD, resting comfortably  HEENT: NC/AT, EOMI, normal hearing, no oral lesions, no LAD, neck supple  Pulmonary: normal resp effort, CTA-B  Cardiovascular: NSR, no murmurs  Abdominal: soft, ND/NT, no organomegaly  Extremities: WWP, normal strength, no clubbing/cyanosis/edema  Neuro: A/O x 3, CNs II-XII grossly intact, normal sensation, no focal deficits  Pulses: palpable distal pulses    Vital Signs Last 24 Hrs  T(C): 37.1 (04 Aug 2023 10:16), Max: 37.1 (04 Aug 2023 10:16)  T(F): 98.8 (04 Aug 2023 10:16), Max: 98.8 (04 Aug 2023 10:16)  HR: 74 (04 Aug 2023 10:16) (74 - 74)  BP: 111/77 (04 Aug 2023 10:16) (111/77 - 111/77)  BP(mean): --  RR: 18 (04 Aug 2023 10:16) (18 - 18)  SpO2: 100% (04 Aug 2023 10:16) (100% - 100%)        I&O's Summary          LABS:              CAPILLARY BLOOD GLUCOSE            Cultures:      RADIOLOGY & ADDITIONAL STUDIES:      Plan:

## 2023-08-04 NOTE — DISCHARGE NOTE PROVIDER - CARE PROVIDER_API CALL
Rashmi Palomo  Obstetrics and Gynecology  [address], [address]  [place] [address]  Phone: [phone]  Fax: [phone]  Follow Up Time:

## 2023-08-04 NOTE — CHART NOTE - NSCHARTNOTEFT_GEN_A_CORE
R2 Post-op Check    Patient seen and examined at bedside. No acute complaints. Pain well controlled. Patient tolerating clears. Has not yet passed flatus. Cummings in place. Denies CP, SOB, N/V, fevers, and chills.    Vital Signs Last 24 Hours  T(C): 36.6 (08-04-23 @ 21:45), Max: 37.1 (08-04-23 @ 10:16)  HR: 73 (08-04-23 @ 21:45) (60 - 74)  BP: 146/82 (08-04-23 @ 21:45) (111/77 - 154/87)  RR: 14 (08-04-23 @ 21:45) (11 - 18)  SpO2: 100% (08-04-23 @ 21:45) (97% - 100%)    I&O's Summary    04 Aug 2023 07:01  -  04 Aug 2023 23:45  --------------------------------------------------------  IN: 370 mL / OUT: 925 mL / NET: -555 mL      Physical Exam:  General: NAD  CV: clinically well perfused  Lungs: unlabored respirations  Abdomen: Soft, non-distended, appropriately tender, no rebound or guarding  Incision: low transverse incision c/d/i with Dermabond Prineo  Ext: No pain or swelling      MEDICATIONS  (STANDING):  chlorhexidine 2% Cloths 1 Application(s) Topical daily  heparin   Injectable 5000 Unit(s) SubCutaneous every 12 hours  ketorolac   Injectable 30 milliGRAM(s) IV Push every 6 hours  lactated ringers. 1000 milliLiter(s) (30 mL/Hr) IV Continuous <Continuous>  lactated ringers. 1000 milliLiter(s) (75 mL/Hr) IV Continuous <Continuous>    MEDICATIONS  (PRN):  ondansetron Injectable 4 milliGRAM(s) IV Push every 6 hours PRN Nausea and/or Vomiting  oxyCODONE    IR 5 milliGRAM(s) Oral every 3 hours PRN Severe Pain (7 - 10)  senna 2 Tablet(s) Oral at bedtime PRN Constipation  simethicone 80 milliGRAM(s) Chew every 4 hours PRN Gas      A/P: 59yo POD#0 s/p ex-lap, ANITA, BS, DARIA, flex sigmoidoscopy. Patient is hemodynamically stable and progressing appropriately in the acute post-operative setting.  Neuro: IV Tylenol, Toradol, and Oxy PRN for pain control  CV: Hemodynamically stable  Pulm: Saturating well on room air. Encourage incentive spirometry  GI: Advance to regular diet, simethicone, Zofran, senna  : UOP adequate, monitor overnight  Heme: HSQ/SCDs/OOB for DVT ppx  ID: No active issues  Endo: No active issues  Dispo: stable for transfer to floor, c/w inpatient management    Dipti Geiger, PGY-2

## 2023-08-04 NOTE — BRIEF OPERATIVE NOTE - COMMENTS
Vistaseal placed on all operative sites   Dictation by MD Schmidt Vistaseal placed on all operative sites   Dictation by MD Schmidt #[id number]

## 2023-08-04 NOTE — BRIEF OPERATIVE NOTE - OPERATION/FINDINGS
Intraop consult for r/o bowel injury during GYN hysterectomy. Exposed small bowel and large bowel examined. Cecum, appendix, and terminal ileum normal in appearance. Fat overlying distal sigmoid colon near midline with Ligasure medeiros. No obvious spillage or mucosal injury seen. Intraoperative sigmoidoscopy performed, leak test negative. Intraop consult for r/o bowel injury during GYN hysterectomy. Exposed small bowel and large bowel examined via Pfannenstiel incision made by GYN. Cecum, appendix, and terminal ileum normal in appearance. Fat overlying distal sigmoid colon near midline with Ligasure medeiros. No obvious spillage or mucosal injury seen. Intraoperative sigmoidoscopy performed, leak test negative. Intraop consult for r/o bowel injury during GYN hysterectomy. Exposed small bowel and large bowel examined via Pfannenstiel incision made by GYN. Cecum, appendix, and terminal ileum normal in appearance. Fat and serosa overlying distal sigmoid colon near midline with Ligasure medeiros. No obvious spillage or mucosal injury seen. Intraoperative sigmoidoscopy performed, leak test negative. Intraop consult for r/o bowel injury during GYN hysterectomy. Exposed small bowel and large bowel examined via Pfannenstiel incision made by GYN. Cecum, appendix, and terminal ileum normal in appearance. Fat, serosa, and ?uterine tissue remnants overlying distal sigmoid colon near midline with Ligasure medeiros. No obvious spillage or mucosal injury seen. Intraoperative sigmoidoscopy performed, leak test negative.

## 2023-08-04 NOTE — PATIENT PROFILE ADULT - FALL HARM RISK - HARM RISK INTERVENTIONS

## 2023-08-04 NOTE — DISCHARGE NOTE PROVIDER - NSDCCPTREATMENT_GEN_ALL_CORE_FT
PRINCIPAL PROCEDURE  Procedure: Supracervical hysterectomy  Findings and Treatment:       SECONDARY PROCEDURE  Procedure: Salpingectomy, total, bilateral  Findings and Treatment:     Procedure: Laparoscopic lysis of adhesions of pelvis  Findings and Treatment:     Procedure: Intraoperative rigid sigmoidoscopy  Findings and Treatment:

## 2023-08-04 NOTE — BRIEF OPERATIVE NOTE - NSICDXBRIEFPREOP_GEN_ALL_CORE_FT
PRE-OP DIAGNOSIS:  Uterine leiomyoma 04-Aug-2023 18:13:06  Nguyen Bell  
PRE-OP DIAGNOSIS:  Uterine leiomyoma 04-Aug-2023 18:13:06  Nguyen Bell

## 2023-08-04 NOTE — BRIEF OPERATIVE NOTE - OPERATION/FINDINGS
EUA: Approx 15wk size bulky uterus with limited mobility.   Operative: Enlarged multifibroid uterus. Dense adhesions of uterus to bladder, bilateral ovaries, colon, small bowel, pelvic side walls, and anterior abdominal wall. Appendix seen, wnl. Bladder backfilled with methylene blue during dissection, no extravasation. Area of sigmoid colon noted to have evidence of cautery very near serosa. Gen surge consulted intra op and performed rigid sigmoidoscopy, no bowel defect.

## 2023-08-04 NOTE — DISCHARGE NOTE PROVIDER - NSDCMRMEDTOKEN_GEN_ALL_CORE_FT
latanoprost 0.005% ophthalmic solution: 1 drop(s) to each affected eye once a day (in the evening)  Tylenol 325 mg oral tablet: 1 tab(s) orally 2 times a day, As Needed   acetaminophen 325 mg oral tablet: 3 tab(s) orally every 6 hours  ibuprofen 600 mg oral tablet: 1 tab(s) orally every 6 hours  latanoprost 0.005% ophthalmic solution: 1 drop(s) to each affected eye once a day (in the evening)  oxyCODONE 5 mg oral tablet: 1 tab(s) orally every 6 hours as needed for Severe Pain (7 - 10) MDD: 4 tabs per day

## 2023-08-05 LAB
BASOPHILS # BLD AUTO: 0.01 K/UL — SIGNIFICANT CHANGE UP (ref 0–0.2)
BASOPHILS NFR BLD AUTO: 0.1 % — SIGNIFICANT CHANGE UP (ref 0–2)
EOSINOPHIL # BLD AUTO: 0 K/UL — SIGNIFICANT CHANGE UP (ref 0–0.5)
EOSINOPHIL NFR BLD AUTO: 0 % — SIGNIFICANT CHANGE UP (ref 0–6)
HCT VFR BLD CALC: 28.7 % — LOW (ref 34.5–45)
HGB BLD-MCNC: 8.9 G/DL — LOW (ref 11.5–15.5)
IANC: 5.34 K/UL — SIGNIFICANT CHANGE UP (ref 1.8–7.4)
IMM GRANULOCYTES NFR BLD AUTO: 0.1 % — SIGNIFICANT CHANGE UP (ref 0–0.9)
LYMPHOCYTES # BLD AUTO: 0.89 K/UL — LOW (ref 1–3.3)
LYMPHOCYTES # BLD AUTO: 13 % — SIGNIFICANT CHANGE UP (ref 13–44)
MCHC RBC-ENTMCNC: 24.8 PG — LOW (ref 27–34)
MCHC RBC-ENTMCNC: 31 GM/DL — LOW (ref 32–36)
MCV RBC AUTO: 79.9 FL — LOW (ref 80–100)
MONOCYTES # BLD AUTO: 0.62 K/UL — SIGNIFICANT CHANGE UP (ref 0–0.9)
MONOCYTES NFR BLD AUTO: 9 % — SIGNIFICANT CHANGE UP (ref 2–14)
NEUTROPHILS # BLD AUTO: 5.34 K/UL — SIGNIFICANT CHANGE UP (ref 1.8–7.4)
NEUTROPHILS NFR BLD AUTO: 77.8 % — HIGH (ref 43–77)
NRBC # BLD: 0 /100 WBCS — SIGNIFICANT CHANGE UP (ref 0–0)
NRBC # FLD: 0 K/UL — SIGNIFICANT CHANGE UP (ref 0–0)
PLATELET # BLD AUTO: 272 K/UL — SIGNIFICANT CHANGE UP (ref 150–400)
RBC # BLD: 3.59 M/UL — LOW (ref 3.8–5.2)
RBC # FLD: 17.4 % — HIGH (ref 10.3–14.5)
WBC # BLD: 6.87 K/UL — SIGNIFICANT CHANGE UP (ref 3.8–10.5)
WBC # FLD AUTO: 6.87 K/UL — SIGNIFICANT CHANGE UP (ref 3.8–10.5)

## 2023-08-05 RX ORDER — IBUPROFEN 200 MG
600 TABLET ORAL EVERY 6 HOURS
Refills: 0 | Status: DISCONTINUED | OUTPATIENT
Start: 2023-08-05 | End: 2023-08-08

## 2023-08-05 RX ORDER — ACETAMINOPHEN 500 MG
975 TABLET ORAL EVERY 6 HOURS
Refills: 0 | Status: DISCONTINUED | OUTPATIENT
Start: 2023-08-05 | End: 2023-08-08

## 2023-08-05 RX ADMIN — Medication 30 MILLIGRAM(S): at 05:32

## 2023-08-05 RX ADMIN — Medication 975 MILLIGRAM(S): at 19:12

## 2023-08-05 RX ADMIN — Medication 30 MILLIGRAM(S): at 06:05

## 2023-08-05 RX ADMIN — Medication 400 MILLIGRAM(S): at 00:09

## 2023-08-05 RX ADMIN — Medication 600 MILLIGRAM(S): at 18:12

## 2023-08-05 RX ADMIN — Medication 975 MILLIGRAM(S): at 18:12

## 2023-08-05 RX ADMIN — SODIUM CHLORIDE 75 MILLILITER(S): 9 INJECTION, SOLUTION INTRAVENOUS at 14:25

## 2023-08-05 RX ADMIN — HEPARIN SODIUM 5000 UNIT(S): 5000 INJECTION INTRAVENOUS; SUBCUTANEOUS at 18:12

## 2023-08-05 RX ADMIN — HEPARIN SODIUM 5000 UNIT(S): 5000 INJECTION INTRAVENOUS; SUBCUTANEOUS at 05:32

## 2023-08-05 RX ADMIN — Medication 30 MILLIGRAM(S): at 00:11

## 2023-08-05 RX ADMIN — Medication 1000 MILLIGRAM(S): at 06:05

## 2023-08-05 RX ADMIN — Medication 400 MILLIGRAM(S): at 05:32

## 2023-08-05 RX ADMIN — Medication 30 MILLIGRAM(S): at 12:48

## 2023-08-05 RX ADMIN — Medication 400 MILLIGRAM(S): at 12:48

## 2023-08-05 RX ADMIN — Medication 30 MILLIGRAM(S): at 13:05

## 2023-08-05 RX ADMIN — Medication 1000 MILLIGRAM(S): at 00:40

## 2023-08-05 RX ADMIN — Medication 600 MILLIGRAM(S): at 19:12

## 2023-08-05 RX ADMIN — Medication 1000 MILLIGRAM(S): at 13:05

## 2023-08-05 RX ADMIN — Medication 30 MILLIGRAM(S): at 00:40

## 2023-08-05 NOTE — PROGRESS NOTE ADULT - ATTENDING COMMENTS
Attending Note     POD 1 s/p ANITA/BS/Lysis of adhesion   pt reports pain is controlled   parikh in place  not yet ambulated     abd soft, nontender  incisiion c/d/i   ext no c/c/e     A/P POD 1 s/p ANITA/BS/lysis of adhesions   d/c parikh this AM   encourage ambulatoin   routine postop care    DAYAN Marte MD

## 2023-08-05 NOTE — PROGRESS NOTE ADULT - ASSESSMENT
Assessment/Plan: 58y female POD#1 , s/p Ex Lap, ANITA, DARIA, flex sig by gen surg. Doing well on POD 1 with some gas pain expected in this post operative period.    Neuro:  Continue oral meds for pain control.  CV: Hemodynamically stable Hct: 35->28 EBL 700cc  Pulm: Saturating well on room air. Encourage ambulation.   GI:  Continue regular diet.   : Cummings in place. Adequate UOP  DTV later today  Heme: Continue HSQ&Venodynes for DVT ppx. Increase OOB.    FEN: LR@75  ID: Afebrile  Endo: No active issues  Dispo: Continue routine post operative care    Dutch Us PGY 2

## 2023-08-06 ENCOUNTER — TRANSCRIPTION ENCOUNTER (OUTPATIENT)
Age: 58
End: 2023-08-06

## 2023-08-06 RX ORDER — SODIUM CHLORIDE 9 MG/ML
3 INJECTION INTRAMUSCULAR; INTRAVENOUS; SUBCUTANEOUS EVERY 8 HOURS
Refills: 0 | Status: DISCONTINUED | OUTPATIENT
Start: 2023-08-06 | End: 2023-08-08

## 2023-08-06 RX ORDER — ACETAMINOPHEN 500 MG
1000 TABLET ORAL ONCE
Refills: 0 | Status: COMPLETED | OUTPATIENT
Start: 2023-08-06 | End: 2023-08-06

## 2023-08-06 RX ORDER — ACETAMINOPHEN 500 MG
3 TABLET ORAL
Qty: 0 | Refills: 0 | DISCHARGE
Start: 2023-08-06

## 2023-08-06 RX ORDER — METOCLOPRAMIDE HCL 10 MG
10 TABLET ORAL ONCE
Refills: 0 | Status: COMPLETED | OUTPATIENT
Start: 2023-08-06 | End: 2023-08-06

## 2023-08-06 RX ORDER — IBUPROFEN 200 MG
1 TABLET ORAL
Qty: 0 | Refills: 0 | DISCHARGE
Start: 2023-08-06

## 2023-08-06 RX ORDER — POLYETHYLENE GLYCOL 3350 17 G/17G
17 POWDER, FOR SOLUTION ORAL ONCE
Refills: 0 | Status: COMPLETED | OUTPATIENT
Start: 2023-08-06 | End: 2023-08-06

## 2023-08-06 RX ORDER — KETOROLAC TROMETHAMINE 30 MG/ML
30 SYRINGE (ML) INJECTION ONCE
Refills: 0 | Status: DISCONTINUED | OUTPATIENT
Start: 2023-08-06 | End: 2023-08-06

## 2023-08-06 RX ORDER — OXYCODONE HYDROCHLORIDE 5 MG/1
1 TABLET ORAL
Qty: 8 | Refills: 0
Start: 2023-08-06 | End: 2023-08-07

## 2023-08-06 RX ORDER — ACETAMINOPHEN 500 MG
1 TABLET ORAL
Qty: 0 | Refills: 0 | DISCHARGE

## 2023-08-06 RX ADMIN — Medication 600 MILLIGRAM(S): at 20:57

## 2023-08-06 RX ADMIN — POLYETHYLENE GLYCOL 3350 17 GRAM(S): 17 POWDER, FOR SOLUTION ORAL at 17:17

## 2023-08-06 RX ADMIN — Medication 400 MILLIGRAM(S): at 13:28

## 2023-08-06 RX ADMIN — Medication 600 MILLIGRAM(S): at 21:57

## 2023-08-06 RX ADMIN — Medication 30 MILLIGRAM(S): at 13:58

## 2023-08-06 RX ADMIN — Medication 975 MILLIGRAM(S): at 07:49

## 2023-08-06 RX ADMIN — SENNA PLUS 2 TABLET(S): 8.6 TABLET ORAL at 21:30

## 2023-08-06 RX ADMIN — Medication 10 MILLIGRAM(S): at 13:07

## 2023-08-06 RX ADMIN — Medication 600 MILLIGRAM(S): at 07:49

## 2023-08-06 RX ADMIN — Medication 975 MILLIGRAM(S): at 20:57

## 2023-08-06 RX ADMIN — SODIUM CHLORIDE 3 MILLILITER(S): 9 INJECTION INTRAMUSCULAR; INTRAVENOUS; SUBCUTANEOUS at 07:30

## 2023-08-06 RX ADMIN — Medication 600 MILLIGRAM(S): at 06:50

## 2023-08-06 RX ADMIN — HEPARIN SODIUM 5000 UNIT(S): 5000 INJECTION INTRAVENOUS; SUBCUTANEOUS at 17:17

## 2023-08-06 RX ADMIN — Medication 1000 MILLIGRAM(S): at 13:58

## 2023-08-06 RX ADMIN — Medication 975 MILLIGRAM(S): at 06:49

## 2023-08-06 RX ADMIN — ONDANSETRON 4 MILLIGRAM(S): 8 TABLET, FILM COATED ORAL at 11:53

## 2023-08-06 RX ADMIN — Medication 600 MILLIGRAM(S): at 00:30

## 2023-08-06 RX ADMIN — SODIUM CHLORIDE 3 MILLILITER(S): 9 INJECTION INTRAMUSCULAR; INTRAVENOUS; SUBCUTANEOUS at 14:07

## 2023-08-06 RX ADMIN — HEPARIN SODIUM 5000 UNIT(S): 5000 INJECTION INTRAVENOUS; SUBCUTANEOUS at 06:51

## 2023-08-06 RX ADMIN — Medication 975 MILLIGRAM(S): at 01:33

## 2023-08-06 RX ADMIN — SODIUM CHLORIDE 3 MILLILITER(S): 9 INJECTION INTRAMUSCULAR; INTRAVENOUS; SUBCUTANEOUS at 21:38

## 2023-08-06 RX ADMIN — Medication 30 MILLIGRAM(S): at 13:28

## 2023-08-06 RX ADMIN — Medication 975 MILLIGRAM(S): at 21:57

## 2023-08-06 RX ADMIN — Medication 975 MILLIGRAM(S): at 00:30

## 2023-08-06 RX ADMIN — Medication 600 MILLIGRAM(S): at 01:34

## 2023-08-06 NOTE — DISCHARGE NOTE NURSING/CASE MANAGEMENT/SOCIAL WORK - PATIENT PORTAL LINK FT
You can access the FollowMyHealth Patient Portal offered by [facility] by registering at the following website: [email or web address]. By joining [facility]’s FollowMyHealth portal, you will also be able to view your health information using other applications (apps) compatible with our system.

## 2023-08-06 NOTE — PROGRESS NOTE ADULT - ASSESSMENT
Assessment/Plan: 58y female POD#2 , s/p Ex Lap, ANITA, DARIA, flex sig by gen surg. Doing well on POD 1 with some gas pain expected in this post operative period.    Neuro:  Continue oral meds for pain control.  CV: Hemodynamically stable Hct: 35->28 EBL 700cc  Pulm: Saturating well on room air. Encourage ambulation.   GI:  Continue regular diet.   : voiding adequately sponanteously  Heme: Continue HSQ&Venodynes for DVT ppx. Increase OOB.    FEN: SLIV  ID: Afebrile  Endo: No active issues  Dispo: Continue routine post operative care    Dutch Us PGY 2

## 2023-08-06 NOTE — DISCHARGE NOTE NURSING/CASE MANAGEMENT/SOCIAL WORK - NSDCPEFALRISK_GEN_ALL_CORE
For information on Fall & Injury Prevention, visit: [email or web address] OR  [email or web address] OR  https://www.cdc.gov/steadi/patient.html

## 2023-08-07 LAB
ANION GAP SERPL CALC-SCNC: 11 MMOL/L — SIGNIFICANT CHANGE UP (ref 7–14)
BASOPHILS # BLD AUTO: 0.04 K/UL — SIGNIFICANT CHANGE UP (ref 0–0.2)
BASOPHILS NFR BLD AUTO: 0.6 % — SIGNIFICANT CHANGE UP (ref 0–2)
BUN SERPL-MCNC: 12 MG/DL — SIGNIFICANT CHANGE UP (ref 7–23)
CALCIUM SERPL-MCNC: 8.8 MG/DL — SIGNIFICANT CHANGE UP (ref 8.4–10.5)
CHLORIDE SERPL-SCNC: 104 MMOL/L — SIGNIFICANT CHANGE UP (ref 98–107)
CO2 SERPL-SCNC: 27 MMOL/L — SIGNIFICANT CHANGE UP (ref 22–31)
CREAT SERPL-MCNC: 0.84 MG/DL — SIGNIFICANT CHANGE UP (ref 0.5–1.3)
EGFR: 82 ML/MIN/1.73M2 — SIGNIFICANT CHANGE UP
EOSINOPHIL # BLD AUTO: 0.03 K/UL — SIGNIFICANT CHANGE UP (ref 0–0.5)
EOSINOPHIL NFR BLD AUTO: 0.5 % — SIGNIFICANT CHANGE UP (ref 0–6)
GLUCOSE BLDC GLUCOMTR-MCNC: 104 MG/DL — HIGH (ref 70–99)
GLUCOSE SERPL-MCNC: 115 MG/DL — HIGH (ref 70–99)
HCT VFR BLD CALC: 27.8 % — LOW (ref 34.5–45)
HGB BLD-MCNC: 8.5 G/DL — LOW (ref 11.5–15.5)
IANC: 4.27 K/UL — SIGNIFICANT CHANGE UP (ref 1.8–7.4)
IMM GRANULOCYTES NFR BLD AUTO: 0.3 % — SIGNIFICANT CHANGE UP (ref 0–0.9)
LYMPHOCYTES # BLD AUTO: 1.58 K/UL — SIGNIFICANT CHANGE UP (ref 1–3.3)
LYMPHOCYTES # BLD AUTO: 24.4 % — SIGNIFICANT CHANGE UP (ref 13–44)
MCHC RBC-ENTMCNC: 24.4 PG — LOW (ref 27–34)
MCHC RBC-ENTMCNC: 30.6 GM/DL — LOW (ref 32–36)
MCV RBC AUTO: 79.9 FL — LOW (ref 80–100)
MONOCYTES # BLD AUTO: 0.54 K/UL — SIGNIFICANT CHANGE UP (ref 0–0.9)
MONOCYTES NFR BLD AUTO: 8.3 % — SIGNIFICANT CHANGE UP (ref 2–14)
NEUTROPHILS # BLD AUTO: 4.27 K/UL — SIGNIFICANT CHANGE UP (ref 1.8–7.4)
NEUTROPHILS NFR BLD AUTO: 65.9 % — SIGNIFICANT CHANGE UP (ref 43–77)
NRBC # BLD: 0 /100 WBCS — SIGNIFICANT CHANGE UP (ref 0–0)
NRBC # FLD: 0 K/UL — SIGNIFICANT CHANGE UP (ref 0–0)
PLATELET # BLD AUTO: 289 K/UL — SIGNIFICANT CHANGE UP (ref 150–400)
POTASSIUM SERPL-MCNC: 4.5 MMOL/L — SIGNIFICANT CHANGE UP (ref 3.5–5.3)
POTASSIUM SERPL-SCNC: 4.5 MMOL/L — SIGNIFICANT CHANGE UP (ref 3.5–5.3)
RBC # BLD: 3.48 M/UL — LOW (ref 3.8–5.2)
RBC # FLD: 17.9 % — HIGH (ref 10.3–14.5)
SODIUM SERPL-SCNC: 142 MMOL/L — SIGNIFICANT CHANGE UP (ref 135–145)
WBC # BLD: 6.48 K/UL — SIGNIFICANT CHANGE UP (ref 3.8–10.5)
WBC # FLD AUTO: 6.48 K/UL — SIGNIFICANT CHANGE UP (ref 3.8–10.5)

## 2023-08-07 RX ORDER — POLYETHYLENE GLYCOL 3350 17 G/17G
17 POWDER, FOR SOLUTION ORAL ONCE
Refills: 0 | Status: COMPLETED | OUTPATIENT
Start: 2023-08-07 | End: 2023-08-07

## 2023-08-07 RX ORDER — CALCIUM CARBONATE 500(1250)
1 TABLET ORAL DAILY
Refills: 0 | Status: DISCONTINUED | OUTPATIENT
Start: 2023-08-07 | End: 2023-08-08

## 2023-08-07 RX ADMIN — Medication 600 MILLIGRAM(S): at 17:50

## 2023-08-07 RX ADMIN — HEPARIN SODIUM 5000 UNIT(S): 5000 INJECTION INTRAVENOUS; SUBCUTANEOUS at 06:05

## 2023-08-07 RX ADMIN — SODIUM CHLORIDE 3 MILLILITER(S): 9 INJECTION INTRAMUSCULAR; INTRAVENOUS; SUBCUTANEOUS at 14:00

## 2023-08-07 RX ADMIN — Medication 975 MILLIGRAM(S): at 18:50

## 2023-08-07 RX ADMIN — Medication 975 MILLIGRAM(S): at 07:09

## 2023-08-07 RX ADMIN — SODIUM CHLORIDE 3 MILLILITER(S): 9 INJECTION INTRAMUSCULAR; INTRAVENOUS; SUBCUTANEOUS at 06:12

## 2023-08-07 RX ADMIN — Medication 10 MILLIGRAM(S): at 00:41

## 2023-08-07 RX ADMIN — Medication 600 MILLIGRAM(S): at 18:50

## 2023-08-07 RX ADMIN — ONDANSETRON 4 MILLIGRAM(S): 8 TABLET, FILM COATED ORAL at 00:21

## 2023-08-07 RX ADMIN — Medication 975 MILLIGRAM(S): at 17:49

## 2023-08-07 RX ADMIN — SODIUM CHLORIDE 3 MILLILITER(S): 9 INJECTION INTRAMUSCULAR; INTRAVENOUS; SUBCUTANEOUS at 21:31

## 2023-08-07 RX ADMIN — HEPARIN SODIUM 5000 UNIT(S): 5000 INJECTION INTRAVENOUS; SUBCUTANEOUS at 17:51

## 2023-08-07 RX ADMIN — Medication 975 MILLIGRAM(S): at 06:09

## 2023-08-07 RX ADMIN — Medication 600 MILLIGRAM(S): at 07:09

## 2023-08-07 RX ADMIN — Medication 600 MILLIGRAM(S): at 06:09

## 2023-08-07 RX ADMIN — Medication 1 TABLET(S): at 23:36

## 2023-08-07 NOTE — PROGRESS NOTE ADULT - ASSESSMENT
57y/o POD#3 from ex lap, ANITA, DARIA, flex sig by gen surg, in stable condition.    Neuro: c/w po pain meds  CV: Hemodynamically stable. Hgb 8.9->8.5  Pulm: Saturating well on room air, encourage incentive spirometry  GI: c/w regular diet. Zofran, senna and simethicone PRN  : voiding spontaneously  Heme: c/w HSQ and SCDs for DVT ppx  Dispo: Continue routine post-op care

## 2023-08-07 NOTE — PROGRESS NOTE ADULT - ATTENDING COMMENTS
OBGYN Attending    Pt seen and examined at bedside.  Agree with above w/ edits below.  Patient ambulating, voiding w/o difficulty, passing flatus.  Pain is controlled.  Patient reports she still feels a lot of indigestion and difficulty with PO intake.  Is currently only drinking.       Abdomen benign and minimally tender   Incision c/d/i   Ext NT b/l    A/P: POD#3 w/ course c/b post op ileus.   Currently slowly resolving.  Continue liquid diet.  Stable and doing well post-partum.   -advacned to increase PO intake as tolerated  DVT ppx w/ HSQ, SCD's   Encourage ambulation   Routine post-op care    WINSTON Pettit MD OBGYN Attending    Pt seen and examined at bedside.  Agree with above w/ edits below.  Patient ambulating, voiding w/o difficulty, passing flatus.  Pain is controlled.  Patient reports she still feels a lot of indigestion and difficulty with PO intake.  Is currently only drinking.       Abdomen benign and minimally tender   Incision c/d/i   Ext NT b/l    A/P: POD#3 w/ course c/b post op ileus.   Currently slowly resolving.  Continue liquid diet.   -advance diet as tolerated  DVT ppx w/ HSQ, SCD's   Encourage ambulation   Routine post-op care    WINSTON Pettit MD

## 2023-08-08 VITALS
RESPIRATION RATE: 16 BRPM | DIASTOLIC BLOOD PRESSURE: 75 MMHG | HEART RATE: 88 BPM | SYSTOLIC BLOOD PRESSURE: 124 MMHG | TEMPERATURE: 99 F | OXYGEN SATURATION: 99 %

## 2023-08-08 RX ORDER — FAMOTIDINE 10 MG/ML
20 INJECTION INTRAVENOUS DAILY
Refills: 0 | Status: DISCONTINUED | OUTPATIENT
Start: 2023-08-08 | End: 2023-08-08

## 2023-08-08 RX ADMIN — HEPARIN SODIUM 5000 UNIT(S): 5000 INJECTION INTRAVENOUS; SUBCUTANEOUS at 06:05

## 2023-08-08 RX ADMIN — SODIUM CHLORIDE 3 MILLILITER(S): 9 INJECTION INTRAMUSCULAR; INTRAVENOUS; SUBCUTANEOUS at 05:59

## 2023-08-08 RX ADMIN — FAMOTIDINE 20 MILLIGRAM(S): 10 INJECTION INTRAVENOUS at 01:23

## 2023-08-08 NOTE — PROGRESS NOTE ADULT - SUBJECTIVE AND OBJECTIVE BOX
Gyn Progress Note     SUBJECTIVE:   Patient seen and examined at bedside. No nausea/vomiting overnight. + bowel movement and passing flatus throughout the day yesterday. Pain well controlled. Patient is ambulating and tolerating regular diet. Denies CP, SOB, fevers, and chills.    PHYSICAL EXAM:   T(F): 97.8 (08-08-23 @ 05:59), Max: 99 (08-07-23 @ 21:03)  HR: 81 (08-08-23 @ 05:59) (74 - 88)  BP: 145/87 (08-08-23 @ 05:59) (117/71 - 150/84)  RR: 16 (08-08-23 @ 05:59) (16 - 20)  SpO2: 95% (08-08-23 @ 05:59) (95% - 100%)  Wt(kg): --    Physical Exam:  General: NAD  CV: RRR  Lungs: CTA-B  Abdomen: Soft, non-tender, non-distended, +BS  Incision: Dermablopez Prineo noted on low transverse incision, cdi  Ext: No pain or swelling      LABS:  08-07    142    |  104    |  12     ----------------------------<  115<H>  4.5     |  27     |  0.84     Ca    8.8        07 Aug 2023 05:30            Urinalysis Basic - ( 07 Aug 2023 05:30 )    Color: x / Appearance: x / SG: x / pH: x  Gluc: 115 mg/dL / Ketone: x  / Bili: x / Urobili: x   Blood: x / Protein: x / Nitrite: x   Leuk Esterase: x / RBC: x / WBC x   Sq Epi: x / Non Sq Epi: x / Bacteria: x          I&O's Summary    06 Aug 2023 07:01  -  07 Aug 2023 07:00  --------------------------------------------------------  IN: 1320 mL / OUT: 1350 mL / NET: -30 mL    07 Aug 2023 07:01  -  08 Aug 2023 06:52  --------------------------------------------------------  IN: 960 mL / OUT: 550 mL / NET: 410 mL        MEDICATIONS  (STANDING):  acetaminophen     Tablet .. 975 milliGRAM(s) Oral every 6 hours  calcium carbonate    500 mG (Tums) Chewable 1 Tablet(s) Chew daily  chlorhexidine 2% Cloths 1 Application(s) Topical daily  famotidine    Tablet 20 milliGRAM(s) Oral daily  heparin   Injectable 5000 Unit(s) SubCutaneous every 12 hours  ibuprofen  Tablet. 600 milliGRAM(s) Oral every 6 hours  sodium chloride 0.9% lock flush 3 milliLiter(s) IV Push every 8 hours    MEDICATIONS  (PRN):  ondansetron Injectable 4 milliGRAM(s) IV Push every 6 hours PRN Nausea and/or Vomiting  oxyCODONE    IR 5 milliGRAM(s) Oral every 3 hours PRN Severe Pain (7 - 10)  senna 2 Tablet(s) Oral at bedtime PRN Constipation  simethicone 80 milliGRAM(s) Chew every 4 hours PRN Gas        
Subjective:   Pt seen and examined at bedside. No events overnight. Pain well controlled. With gas pain not passing gas. Tolerating regular diet. Pt denies fever, chills, chest pain, SOB, nausea, vomiting, lightheadedness, dizziness. Had some resolved chest pain that she believes is gas pain.    Objective:    MEDICATIONS  (STANDING):  acetaminophen   IVPB .. 1000 milliGRAM(s) IV Intermittent once  acetaminophen   IVPB .. 1000 milliGRAM(s) IV Intermittent once  chlorhexidine 2% Cloths 1 Application(s) Topical daily  heparin   Injectable 5000 Unit(s) SubCutaneous every 12 hours  ketorolac   Injectable 30 milliGRAM(s) IV Push every 6 hours  lactated ringers. 1000 milliLiter(s) (30 mL/Hr) IV Continuous <Continuous>  lactated ringers. 1000 milliLiter(s) (75 mL/Hr) IV Continuous <Continuous>    MEDICATIONS  (PRN):  ondansetron Injectable 4 milliGRAM(s) IV Push every 6 hours PRN Nausea and/or Vomiting  oxyCODONE    IR 5 milliGRAM(s) Oral every 3 hours PRN Severe Pain (7 - 10)  senna 2 Tablet(s) Oral at bedtime PRN Constipation  simethicone 80 milliGRAM(s) Chew every 4 hours PRN Gas      T(F): 98.6 (08-05-23 @ 05:01), Max: 98.8 (08-04-23 @ 10:16)  HR: 76 (08-05-23 @ 05:01) (60 - 85)  BP: 122/57 (08-05-23 @ 05:01) (111/77 - 154/87)  RR: 16 (08-05-23 @ 05:01) (11 - 18)  SpO2: 100% (08-05-23 @ 05:01) (97% - 100%)  Wt(kg): --    Physical Exam:  Constitutional: NAD, A+O x3  CV: RRR  Lungs: Clear to auscultation bilaterally  Abdomen: Soft, nondistended, no guarding or rebound tenderness. Normal bowel sounds  Incision: Low transverse skin incision c/d/i  : No bleeding on pad  Extremities: No lower extremity edema or calf tenderness bilaterally; venodynes in place    LABS:                CAPILLARY BLOOD GLUCOSE          I&O's Summary    04 Aug 2023 07:01  -  05 Aug 2023 07:00  --------------------------------------------------------  IN: 1290 mL / OUT: 1275 mL / NET: 15 mL        
57yo F on POD3 s/p ex lap, ANITA, DARIA, flex sig. Patient seen and examined at bedside. Pt c/o emesis x1 & gas pain last night. Had a bowel movement s/p Dulcolax suppository. Pain well controlled. Patient is ambulating and tolerating regular diet. Denies CP, SOB, fevers, and chills.    Vital Signs Last 24 Hours  T(C): 36.7 (08-07-23 @ 06:09), Max: 37.6 (08-06-23 @ 18:10)  HR: 95 (08-07-23 @ 06:09) (72 - 98)  BP: 141/86 (08-07-23 @ 06:09) (122/68 - 159/89)  RR: 16 (08-07-23 @ 06:09) (16 - 18)  SpO2: 100% (08-07-23 @ 06:09) (100% - 100%)    I&O's Summary    06 Aug 2023 07:01  -  07 Aug 2023 07:00  --------------------------------------------------------  IN: 1320 mL / OUT: 1350 mL / NET: -30 mL        Physical Exam:  General: NAD  CV: RRR  Lungs: CTA-B  Abdomen: Soft, non-tender, non-distended, +BS  Incision: Dermjazmine Lagos noted on low transverse incision. c/d/i.  Ext: No pain or swelling    Labs:             8.5<L>  6.48  )-----------( 289      ( 08-07 @ 05:30 )             27.8<L>               8.9<L>  6.87  )-----------( 272      ( 08-05 @ 06:47 )             28.7<L>        MEDICATIONS  (STANDING):  acetaminophen     Tablet .. 975 milliGRAM(s) Oral every 6 hours  chlorhexidine 2% Cloths 1 Application(s) Topical daily  heparin   Injectable 5000 Unit(s) SubCutaneous every 12 hours  ibuprofen  Tablet. 600 milliGRAM(s) Oral every 6 hours  sodium chloride 0.9% lock flush 3 milliLiter(s) IV Push every 8 hours    MEDICATIONS  (PRN):  ondansetron Injectable 4 milliGRAM(s) IV Push every 6 hours PRN Nausea and/or Vomiting  oxyCODONE    IR 5 milliGRAM(s) Oral every 3 hours PRN Severe Pain (7 - 10)  senna 2 Tablet(s) Oral at bedtime PRN Constipation  simethicone 80 milliGRAM(s) Chew every 4 hours PRN Gas  
Subjective:   Pt seen and examined at bedside. No events overnight. Pain well controlled. Patient ambulating. Passing flatus. Tolerating regular diet. Pt denies fever, chills, chest pain, SOB, nausea, vomiting, lightheadedness, dizziness.      Objective:    MEDICATIONS  (STANDING):  acetaminophen     Tablet .. 975 milliGRAM(s) Oral every 6 hours  chlorhexidine 2% Cloths 1 Application(s) Topical daily  heparin   Injectable 5000 Unit(s) SubCutaneous every 12 hours  ibuprofen  Tablet. 600 milliGRAM(s) Oral every 6 hours  sodium chloride 0.9% lock flush 3 milliLiter(s) IV Push every 8 hours    MEDICATIONS  (PRN):  ondansetron Injectable 4 milliGRAM(s) IV Push every 6 hours PRN Nausea and/or Vomiting  oxyCODONE    IR 5 milliGRAM(s) Oral every 3 hours PRN Severe Pain (7 - 10)  senna 2 Tablet(s) Oral at bedtime PRN Constipation  simethicone 80 milliGRAM(s) Chew every 4 hours PRN Gas      T(F): 97.8 (08-06-23 @ 02:00), Max: 100.3 (08-05-23 @ 10:59)  HR: 75 (08-06-23 @ 02:00) (75 - 95)  BP: 135/68 (08-06-23 @ 02:00) (104/57 - 135/68)  RR: 17 (08-06-23 @ 02:00) (17 - 18)  SpO2: 99% (08-06-23 @ 02:00) (99% - 100%)  Wt(kg): --    Physical Exam:  Constitutional: NAD, A+O x3  CV: RRR  Lungs: Clear to auscultation bilaterally  Abdomen: Soft, nondistended, no guarding or rebound tenderness. Normal bowel sounds  Incision: Low transverse skin incision c/d/i  : No bleeding on pad  Extremities: No lower extremity edema or calf tenderness bilaterally; venodynes in place      LABS:                CAPILLARY BLOOD GLUCOSE          I&O's Summary    04 Aug 2023 07:01  -  05 Aug 2023 07:00  --------------------------------------------------------  IN: 1290 mL / OUT: 1275 mL / NET: 15 mL    05 Aug 2023 07:01  -  06 Aug 2023 06:43  --------------------------------------------------------  IN: 2120 mL / OUT: 1550 mL / NET: 570 mL

## 2023-08-08 NOTE — PROGRESS NOTE ADULT - ASSESSMENT
Assessment/Plan: 57y/o POD#4 from ex lap, ANITA, DARIA, flex sig by gen surg recovering well in stable condition. Patient's nausea improved yesterday with no episodes of emesis overnight. Patient continues to have BM w/ passing flatus.     Neuro: Continue oral meds for pain control.  CV: Hemodynamically stable  Pulm: Saturating well on room air. Encourage ambulation.   GI: Continue regular diet. Pepcid, Tums & Simethicone as needed  : Voiding spontaneously, adequate urine output overnight   Heme: Continue HSQ. Venodynes for DVT ppx. Increase OOB.    FEN: SLIV  ID: Afebrile  Endo: No active issues  Dispo: Continue inpatient post op management. Discharge planning for later today.    CARIDAD Britt Assessment/Plan: 57y/o POD#4 from ex lap, ANITA, DARIA, flex sig by gen surg recovering well in stable condition. Patient's nausea improved yesterday with no episodes of emesis overnight. Patient continues to have BM w/ passing flatus.     Neuro: Continue oral meds for pain control.  CV: Hemodynamically stable  Pulm: Saturating well on room air. Encourage ambulation.   GI: Continue regular diet. Pepcid, Tums & Simethicone as needed  : Voiding spontaneously, adequate urine output overnight   Heme: Continue HSQ. Venodynes for DVT ppx. Increase OOB.    FEN: SLIV  ID: Afebrile  Endo: No active issues  Dispo: Continue inpatient post op management. Discharge planning for later today.    CARIDAD Britt    GYN Attending  Patient seen and agree with above  Tolerating po  pain well controlled  stable for discharge  ADELAIDA Palomo

## 2023-08-18 LAB — SURGICAL PATHOLOGY STUDY: SIGNIFICANT CHANGE UP

## 2023-12-11 ENCOUNTER — OUTPATIENT (OUTPATIENT)
Dept: OUTPATIENT SERVICES | Facility: HOSPITAL | Age: 58
LOS: 1 days | End: 2023-12-11
Payer: COMMERCIAL

## 2023-12-11 VITALS
TEMPERATURE: 98 F | SYSTOLIC BLOOD PRESSURE: 138 MMHG | HEART RATE: 70 BPM | WEIGHT: 207.23 LBS | OXYGEN SATURATION: 100 % | DIASTOLIC BLOOD PRESSURE: 83 MMHG | HEIGHT: 63.5 IN | RESPIRATION RATE: 16 BRPM
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
## 2023-12-11 DIAGNOSIS — D48.61 NEOPLASM OF UNCERTAIN BEHAVIOR OF RIGHT BREAST: ICD-10-CM

## 2023-12-11 DIAGNOSIS — Z85.3 PERSONAL HISTORY OF MALIGNANT NEOPLASM OF BREAST: ICD-10-CM

## 2023-12-11 DIAGNOSIS — Z98.890 OTHER SPECIFIED POSTPROCEDURAL STATES: Chronic | ICD-10-CM
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
## 2023-12-11 DIAGNOSIS — S21.001A UNSPECIFIED OPEN WOUND OF RIGHT BREAST, INITIAL ENCOUNTER: ICD-10-CM

## 2023-12-11 DIAGNOSIS — Z92.89 PERSONAL HISTORY OF OTHER MEDICAL TREATMENT: Chronic | ICD-10-CM

## 2023-12-11 DIAGNOSIS — Z90.710 ACQUIRED ABSENCE OF BOTH CERVIX AND UTERUS: Chronic | ICD-10-CM

## 2023-12-11 DIAGNOSIS — Z01.818 ENCOUNTER FOR OTHER PREPROCEDURAL EXAMINATION: ICD-10-CM

## 2023-12-11 LAB
ANION GAP SERPL CALC-SCNC: 6 MMOL/L — SIGNIFICANT CHANGE UP (ref 5–17)
ANION GAP SERPL CALC-SCNC: 6 MMOL/L — SIGNIFICANT CHANGE UP (ref 5–17)
BUN SERPL-MCNC: 11 MG/DL — SIGNIFICANT CHANGE UP (ref 7–23)
BUN SERPL-MCNC: 11 MG/DL — SIGNIFICANT CHANGE UP (ref 7–23)
CALCIUM SERPL-MCNC: 9.1 MG/DL — SIGNIFICANT CHANGE UP (ref 8.4–10.5)
CALCIUM SERPL-MCNC: 9.1 MG/DL — SIGNIFICANT CHANGE UP (ref 8.4–10.5)
CHLORIDE SERPL-SCNC: 104 MMOL/L — SIGNIFICANT CHANGE UP (ref 96–108)
CHLORIDE SERPL-SCNC: 104 MMOL/L — SIGNIFICANT CHANGE UP (ref 96–108)
CO2 SERPL-SCNC: 28 MMOL/L — SIGNIFICANT CHANGE UP (ref 22–31)
CO2 SERPL-SCNC: 28 MMOL/L — SIGNIFICANT CHANGE UP (ref 22–31)
CREAT SERPL-MCNC: 0.78 MG/DL — SIGNIFICANT CHANGE UP (ref 0.5–1.3)
CREAT SERPL-MCNC: 0.78 MG/DL — SIGNIFICANT CHANGE UP (ref 0.5–1.3)
EGFR: 89 ML/MIN/1.73M2 — SIGNIFICANT CHANGE UP
EGFR: 89 ML/MIN/1.73M2 — SIGNIFICANT CHANGE UP
GLUCOSE SERPL-MCNC: 94 MG/DL — SIGNIFICANT CHANGE UP (ref 70–99)
GLUCOSE SERPL-MCNC: 94 MG/DL — SIGNIFICANT CHANGE UP (ref 70–99)
HCT VFR BLD CALC: 33 % — LOW (ref 34.5–45)
HCT VFR BLD CALC: 33 % — LOW (ref 34.5–45)
HGB BLD-MCNC: 9.5 G/DL — LOW (ref 11.5–15.5)
HGB BLD-MCNC: 9.5 G/DL — LOW (ref 11.5–15.5)
MCHC RBC-ENTMCNC: 20.9 PG — LOW (ref 27–34)
MCHC RBC-ENTMCNC: 20.9 PG — LOW (ref 27–34)
MCHC RBC-ENTMCNC: 28.8 GM/DL — LOW (ref 32–36)
MCHC RBC-ENTMCNC: 28.8 GM/DL — LOW (ref 32–36)
MCV RBC AUTO: 72.7 FL — LOW (ref 80–100)
MCV RBC AUTO: 72.7 FL — LOW (ref 80–100)
NRBC # BLD: 0 /100 WBCS — SIGNIFICANT CHANGE UP (ref 0–0)
NRBC # BLD: 0 /100 WBCS — SIGNIFICANT CHANGE UP (ref 0–0)
PLATELET # BLD AUTO: 299 K/UL — SIGNIFICANT CHANGE UP (ref 150–400)
PLATELET # BLD AUTO: 299 K/UL — SIGNIFICANT CHANGE UP (ref 150–400)
POTASSIUM SERPL-MCNC: 4.3 MMOL/L — SIGNIFICANT CHANGE UP (ref 3.5–5.3)
POTASSIUM SERPL-MCNC: 4.3 MMOL/L — SIGNIFICANT CHANGE UP (ref 3.5–5.3)
POTASSIUM SERPL-SCNC: 4.3 MMOL/L — SIGNIFICANT CHANGE UP (ref 3.5–5.3)
POTASSIUM SERPL-SCNC: 4.3 MMOL/L — SIGNIFICANT CHANGE UP (ref 3.5–5.3)
RBC # BLD: 4.54 M/UL — SIGNIFICANT CHANGE UP (ref 3.8–5.2)
RBC # BLD: 4.54 M/UL — SIGNIFICANT CHANGE UP (ref 3.8–5.2)
RBC # FLD: 19.2 % — HIGH (ref 10.3–14.5)
RBC # FLD: 19.2 % — HIGH (ref 10.3–14.5)
SODIUM SERPL-SCNC: 138 MMOL/L — SIGNIFICANT CHANGE UP (ref 135–145)
SODIUM SERPL-SCNC: 138 MMOL/L — SIGNIFICANT CHANGE UP (ref 135–145)
WBC # BLD: 3.26 K/UL — LOW (ref 3.8–10.5)
WBC # BLD: 3.26 K/UL — LOW (ref 3.8–10.5)
WBC # FLD AUTO: 3.26 K/UL — LOW (ref 3.8–10.5)
WBC # FLD AUTO: 3.26 K/UL — LOW (ref 3.8–10.5)

## 2023-12-11 PROCEDURE — 93010 ELECTROCARDIOGRAM REPORT: CPT | Mod: NC

## 2023-12-11 RX ORDER — FLUOCINOLONE ACETONIDE 0.11 MG/20ML
5 OIL AURICULAR (OTIC)
Refills: 0 | DISCHARGE

## 2023-12-11 NOTE — H&P PST ADULT - HISTORY OF PRESENT ILLNESS
This is a 57 y/o female with PMHX of anemia who presents to PST with pre-operative diagnosis of lesion in right breast. Lesion noted in right breast on routine mammogram in 9/2023. Biopsy done and revealed atypical cells, per pt.  Pt denies any breast pain, palpable masses or nipple discharge bilaterally.  Otherwise pt feels well today and denies any acute symptoms.  This is a 59 y/o female with PMHX of anemia who presents to PST with pre-operative diagnosis of lesion in right breast. Lesion noted in right breast on routine mammogram in 9/2023. Biopsy done and revealed atypical cells, per pt.  Pt denies any breast pain, palpable masses or nipple discharge bilaterally.  Otherwise pt feels well today and denies any acute symptoms.

## 2023-12-11 NOTE — H&P PST ADULT - OPHTHALMOLOGIC COMMENTS
reading glasses; family history of glaucoma.  Pt reports she on  latanoprost prophylactically as her eye pressure was elevated

## 2023-12-11 NOTE — H&P PST ADULT - GENITOURINARY COMMENTS
not examined hx of fibroids, pt. states fibroid is compressing ureter. Pt scheduled for abdominal hysterectomy

## 2023-12-11 NOTE — H&P PST ADULT - NSICDXPASTSURGICALHX_GEN_ALL_CORE_FT
PAST SURGICAL HISTORY:  H/O colonoscopy     H/O myomectomy     History of Abdominal Myomectomy In 1997    History of D&C     History of dental surgery     S/P hysterectomy     Status post ORIF of fracture of ankle

## 2023-12-13 ENCOUNTER — OUTPATIENT (OUTPATIENT)
Dept: OUTPATIENT SERVICES | Facility: HOSPITAL | Age: 58
LOS: 1 days | End: 2023-12-13
Payer: COMMERCIAL

## 2023-12-13 ENCOUNTER — APPOINTMENT (OUTPATIENT)
Dept: MAMMOGRAPHY | Facility: IMAGING CENTER | Age: 58
End: 2023-12-13
Payer: COMMERCIAL

## 2023-12-13 DIAGNOSIS — Z98.890 OTHER SPECIFIED POSTPROCEDURAL STATES: Chronic | ICD-10-CM

## 2023-12-13 DIAGNOSIS — Z00.8 ENCOUNTER FOR OTHER GENERAL EXAMINATION: ICD-10-CM

## 2023-12-13 DIAGNOSIS — Z90.710 ACQUIRED ABSENCE OF BOTH CERVIX AND UTERUS: Chronic | ICD-10-CM

## 2023-12-13 DIAGNOSIS — Z92.89 PERSONAL HISTORY OF OTHER MEDICAL TREATMENT: Chronic | ICD-10-CM

## 2023-12-13 PROCEDURE — 93005 ELECTROCARDIOGRAM TRACING: CPT

## 2023-12-13 PROCEDURE — G0463: CPT

## 2023-12-13 PROCEDURE — 19281 PERQ DEVICE BREAST 1ST IMAG: CPT

## 2023-12-13 PROCEDURE — 80048 BASIC METABOLIC PNL TOTAL CA: CPT

## 2023-12-13 PROCEDURE — C1739: CPT

## 2023-12-13 PROCEDURE — 36415 COLL VENOUS BLD VENIPUNCTURE: CPT

## 2023-12-13 PROCEDURE — 19281 PERQ DEVICE BREAST 1ST IMAG: CPT | Mod: RT

## 2023-12-13 PROCEDURE — 85027 COMPLETE CBC AUTOMATED: CPT

## 2023-12-18 ENCOUNTER — TRANSCRIPTION ENCOUNTER (OUTPATIENT)
Age: 58
End: 2023-12-18

## 2023-12-18 RX ORDER — LATANOPROST 0.05 MG/ML
1 SOLUTION/ DROPS OPHTHALMIC; TOPICAL
Qty: 0 | Refills: 0 | DISCHARGE

## 2023-12-18 RX ORDER — KETOCONAZOLE 20 MG/G
1 AEROSOL, FOAM TOPICAL
Refills: 0 | DISCHARGE

## 2023-12-18 RX ORDER — SODIUM SULFACETAMIDE 100 MG/ML
1 SHAMPOO TOPICAL
Refills: 0 | DISCHARGE

## 2023-12-18 NOTE — ASU PATIENT PROFILE, ADULT - NSICDXPASTSURGICALHX_GEN_ALL_CORE_FT
PAST SURGICAL HISTORY:  H/O colonoscopy     H/O left breast biopsy clip    H/O myomectomy     H/O right breast biopsy clip and Mandy     History of Abdominal Myomectomy In 1997    History of D&C     History of dental surgery     S/P hysterectomy     Status post ORIF of fracture of ankle left- 2 plates, 12 screws

## 2023-12-18 NOTE — ASU PATIENT PROFILE, ADULT - FALL HARM RISK - UNIVERSAL INTERVENTIONS
Bed in lowest position, wheels locked, appropriate side rails in place/Call bell, personal items and telephone in reach/Instruct patient to call for assistance before getting out of bed or chair/Non-slip footwear when patient is out of bed/[place] to call system/Physically safe environment - no spills, clutter or unnecessary equipment/Purposeful Proactive Rounding/Room/bathroom lighting operational, light cord in reach Bed in lowest position, wheels locked, appropriate side rails in place/Call bell, personal items and telephone in reach/Instruct patient to call for assistance before getting out of bed or chair/Non-slip footwear when patient is out of bed/[place] to call system/Physically safe environment - no spills, clutter or unnecessary equipment/Purposeful Proactive Rounding/Room/bathroom lighting operational, light cord in reach

## 2023-12-18 NOTE — ASU PATIENT PROFILE, ADULT - REASON FOR ADMISSION, PROFILE
If you are a smoker, it is important for your health to stop smoking. Please be aware that second hand smoke is also harmful.
Right breast lumpectomy, reduction of both breasts for symmetry

## 2023-12-19 ENCOUNTER — TRANSCRIPTION ENCOUNTER (OUTPATIENT)
Age: 58
End: 2023-12-19

## 2023-12-19 ENCOUNTER — OUTPATIENT (OUTPATIENT)
Dept: OUTPATIENT SERVICES | Facility: HOSPITAL | Age: 58
LOS: 1 days | End: 2023-12-19
Payer: COMMERCIAL

## 2023-12-19 VITALS
RESPIRATION RATE: 17 BRPM | WEIGHT: 207.23 LBS | SYSTOLIC BLOOD PRESSURE: 139 MMHG | DIASTOLIC BLOOD PRESSURE: 83 MMHG | TEMPERATURE: 97 F | HEIGHT: 63.5 IN | OXYGEN SATURATION: 98 % | HEART RATE: 71 BPM

## 2023-12-19 DIAGNOSIS — D48.61 NEOPLASM OF UNCERTAIN BEHAVIOR OF RIGHT BREAST: ICD-10-CM

## 2023-12-19 DIAGNOSIS — Z15.01 GENETIC SUSCEPTIBILITY TO MALIGNANT NEOPLASM OF BREAST: ICD-10-CM

## 2023-12-19 DIAGNOSIS — D49.3 NEOPLASM OF UNSPECIFIED BEHAVIOR OF BREAST: ICD-10-CM

## 2023-12-19 DIAGNOSIS — Z92.89 PERSONAL HISTORY OF OTHER MEDICAL TREATMENT: Chronic | ICD-10-CM

## 2023-12-19 DIAGNOSIS — Z90.710 ACQUIRED ABSENCE OF BOTH CERVIX AND UTERUS: Chronic | ICD-10-CM

## 2023-12-19 DIAGNOSIS — Z85.3 PERSONAL HISTORY OF MALIGNANT NEOPLASM OF BREAST: ICD-10-CM

## 2023-12-19 DIAGNOSIS — Z98.890 OTHER SPECIFIED POSTPROCEDURAL STATES: Chronic | ICD-10-CM

## 2023-12-19 DIAGNOSIS — S21.001A UNSPECIFIED OPEN WOUND OF RIGHT BREAST, INITIAL ENCOUNTER: ICD-10-CM

## 2023-12-19 PROCEDURE — 99204 OFFICE O/P NEW MOD 45 MIN: CPT

## 2023-12-19 PROCEDURE — 76098 X-RAY EXAM SURGICAL SPECIMEN: CPT | Mod: 26

## 2023-12-19 RX ORDER — SODIUM CHLORIDE 9 MG/ML
1000 INJECTION, SOLUTION INTRAVENOUS
Refills: 0 | Status: DISCONTINUED | OUTPATIENT
Start: 2023-12-19 | End: 2023-12-19

## 2023-12-19 RX ORDER — ONDANSETRON 8 MG/1
4 TABLET, FILM COATED ORAL ONCE
Refills: 0 | Status: DISCONTINUED | OUTPATIENT
Start: 2023-12-19 | End: 2023-12-19

## 2023-12-19 RX ORDER — HYDROMORPHONE HYDROCHLORIDE 2 MG/ML
0.5 INJECTION INTRAMUSCULAR; INTRAVENOUS; SUBCUTANEOUS
Refills: 0 | Status: DISCONTINUED | OUTPATIENT
Start: 2023-12-19 | End: 2023-12-19

## 2023-12-19 RX ORDER — HYDROMORPHONE HYDROCHLORIDE 2 MG/ML
1 INJECTION INTRAMUSCULAR; INTRAVENOUS; SUBCUTANEOUS
Refills: 0 | Status: DISCONTINUED | OUTPATIENT
Start: 2023-12-19 | End: 2023-12-19

## 2023-12-19 RX ORDER — IPRATROPIUM/ALBUTEROL SULFATE 18-103MCG
3 AEROSOL WITH ADAPTER (GRAM) INHALATION ONCE
Refills: 0 | Status: DISCONTINUED | OUTPATIENT
Start: 2023-12-19 | End: 2023-12-19

## 2023-12-19 RX ORDER — TRAMADOL HYDROCHLORIDE 50 MG/1
25 TABLET ORAL EVERY 4 HOURS
Refills: 0 | Status: DISCONTINUED | OUTPATIENT
Start: 2023-12-19 | End: 2023-12-19

## 2023-12-19 RX ORDER — TRAMADOL HYDROCHLORIDE 50 MG/1
25 TABLET ORAL EVERY 4 HOURS
Refills: 0 | Status: DISCONTINUED | OUTPATIENT
Start: 2023-12-19 | End: 2023-12-20

## 2023-12-19 RX ORDER — OXYCODONE HYDROCHLORIDE 5 MG/1
5 TABLET ORAL ONCE
Refills: 0 | Status: DISCONTINUED | OUTPATIENT
Start: 2023-12-19 | End: 2023-12-19

## 2023-12-19 RX ORDER — SODIUM CHLORIDE 9 MG/ML
1000 INJECTION, SOLUTION INTRAVENOUS
Refills: 0 | Status: DISCONTINUED | OUTPATIENT
Start: 2023-12-19 | End: 2023-12-20

## 2023-12-19 RX ADMIN — HYDROMORPHONE HYDROCHLORIDE 0.5 MILLIGRAM(S): 2 INJECTION INTRAMUSCULAR; INTRAVENOUS; SUBCUTANEOUS at 18:42

## 2023-12-19 RX ADMIN — SODIUM CHLORIDE 50 MILLILITER(S): 9 INJECTION, SOLUTION INTRAVENOUS at 12:21

## 2023-12-19 RX ADMIN — HYDROMORPHONE HYDROCHLORIDE 0.5 MILLIGRAM(S): 2 INJECTION INTRAMUSCULAR; INTRAVENOUS; SUBCUTANEOUS at 18:55

## 2023-12-19 RX ADMIN — OXYCODONE HYDROCHLORIDE 5 MILLIGRAM(S): 5 TABLET ORAL at 20:51

## 2023-12-19 RX ADMIN — OXYCODONE HYDROCHLORIDE 5 MILLIGRAM(S): 5 TABLET ORAL at 22:19

## 2023-12-19 NOTE — ASU DISCHARGE PLAN (ADULT/PEDIATRIC) - NS MD DC FALL RISK RISK
For information on Fall & Injury Prevention, visit: [email or web address]-[email or web address]-mobility OR  https://www.[email or web address]/news/fall-[email or web address]-tips-[email or web address]-injury OR  https://www.cdc.gov/steadi/patient.html For information on Fall & Injury Prevention, visit: [email or web address]://[email or web address]/news/[email or web address]-maintains-health-[email or web address]-mobility OR  https://www.[email or web address]/news/fall-[email or web address]-tips-to-avoid-injury OR  https://www.cdc.gov/steadi/patient.html

## 2023-12-19 NOTE — BRIEF OPERATIVE NOTE - NSICDXBRIEFPREOP_GEN_ALL_CORE_FT
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  
PRE-OP DIAGNOSIS:  History of breast cancer 19-Dec-2023 15:23:44  Ivette Boss

## 2023-12-19 NOTE — ASU DISCHARGE PLAN (ADULT/PEDIATRIC) - ASU DC SPECIAL INSTRUCTIONSFT
Keep dressing clean and dry for 48 hours then may remove dressing and shower.  Replace bra for comfort and support Keep dressing clean and dry until follow up. May shower from the waist jose.  Empty and record drain output twice daily

## 2023-12-19 NOTE — ASU DISCHARGE PLAN (ADULT/PEDIATRIC) - FOLLOW UP APPOINTMENTS
[facility], Ambulatory Surgery Unit [facility], Ambulatory Surgery Unit

## 2023-12-19 NOTE — ASU DISCHARGE PLAN (ADULT/PEDIATRIC) - PROVIDER TOKENS
PROVIDER:[TOKEN:[[id number]],SCHEDULEDAPPT:[12/27/2023],ESTABLISHEDPATIENT:[T]] PROVIDER:[TOKEN:[[id number]],SCHEDULEDAPPT:[12/27/2023],ESTABLISHEDPATIENT:[T]]

## 2023-12-19 NOTE — PROVIDER CONTACT NOTE (OTHER) - ACTION/TREATMENT ORDERED:
duoneb nebulizer treatment X1 dose given@1945, obtained relief but repeated complaint was presented.seen by Dr. Cheatham again, decadron 4mg IVP given by Dr. Cheatham.

## 2023-12-19 NOTE — H&P ADULT - ASSESSMENT
58F with hx of diet controlled HLD s/p R breast lumpectomy and bl breast reduction today post op s/p Dilaudid IV for pain control with complaints of sensation of throat closing and SOB    #Transient episode of sensation of throat sxs/SOB ? sec to IV dilaudid vs post extub irritation   but already resolving s/p Decadron and duoneb.   prn duoneb.   monitor overnight.  58F with hx of diet controlled HLD s/p R breast lumpectomy and bl breast reduction today post op s/p Dilaudid IV for pain control with complaints of sensation of throat closing and SOB    #Transient episode of sensation of throat sxs/SOB ? sec to IV dilaudid vs post extub irritation   but already resolving s/p Decadron and duoneb.   prn duoneb.   monitor overnight.    58F with hx of diet controlled HLD s/p R breast lumpectomy and bl breast reduction today post op s/p Dilaudid IV for pain control with complaints of sensation of throat closing and SOB    #Transient episode of sensation of throat sxs/SOB ? sec to IV dilaudid vs post extub irritation   but already resolving s/p Decadron and duoneb.   prn duoneb.   monitor overnight.   continue tramadol prn for pain

## 2023-12-19 NOTE — ASU DISCHARGE PLAN (ADULT/PEDIATRIC) - CARE PROVIDER_API CALL
Ye Chavarria  Plastic Surgery  [address], [address]  [place] [address]  Phone: [phone]  Fax: [phone]  Established Patient  Scheduled Appointment: 12/27/2023   Ye Chavarria  Plastic Surgery  [address], [address]  [place] [address]  Phone: [phone]  Fax: [phone]  Established Patient  Scheduled Appointment: 12/27/2023

## 2023-12-19 NOTE — H&P ADULT - NSHPPHYSICALEXAM_GEN_ALL_CORE
Vital Signs Last 24 Hrs  T(C): 36.2 (19 Dec 2023 20:15), Max: 36.3 (19 Dec 2023 07:26)  T(F): 97.2 (19 Dec 2023 20:15), Max: 97.4 (19 Dec 2023 07:26)  HR: 83 (19 Dec 2023 20:45) (63 - 83)  BP: 105/58 (19 Dec 2023 20:45) (105/58 - 139/83)  BP(mean): 71 (19 Dec 2023 20:45) (71 - 71)  RR: 23 (19 Dec 2023 20:45) (12 - 23)  SpO2: 100% (19 Dec 2023 20:45) (98% - 100%)    Parameters below as of 19 Dec 2023 19:25  Patient On (Oxygen Delivery Method): room air      Daily Height in cm: 161.29 (19 Dec 2023 07:26)    Daily   CAPILLARY BLOOD GLUCOSE        I&O's Summary    19 Dec 2023 07:01  -  20 Dec 2023 00:01  --------------------------------------------------------  IN: 75 mL / OUT: 50 mL / NET: 25 mL        GENERAL: NAD  HEAD:  Normocephalic  EYES: EOMI, PERRLA, conjunctiva and sclera clear  ENMT: No tonsillar erythema, exudates, or enlargement; Moist mucous membranes, No lesions. no tongue swelling. some hoarseness noted during speech.   NECK: Supple, No JVD, no bruit, normal thyroid  NERVOUS SYSTEM:  Alert & Oriented X3, Good concentration; grossly  Motor Strength 5/5 B/L upper and lower extremities; DTRs 2+ intact and symmetric  CHEST/LUNG: Clear to auscultation bilaterally; No rales, rhonchi, wheezing, or rubs. no stridor.   HEART: Regular rate and rhythm; No murmurs, rubs, or gallops  ABDOMEN: Soft, Nontender, Nondistended; Bowel sounds present  EXTREMITIES:  2+ Peripheral Pulses, No clubbing, cyanosis, or edema  LYMPH: No lymphadenopathy noted  SKIN: No rashes or lesions

## 2023-12-19 NOTE — PROVIDER CONTACT NOTE (OTHER) - BACKGROUND
S/P Right breast Lumpectomy with Bilateral Breast reduction with oncoplasty S/P Right breast Lumpectomy with Right Breast reduction  and reconstruction with oncoplasty. patient was medicated with Dilaudid 0.5 mg by ELIZABETH munguia for pain @ 1842

## 2023-12-19 NOTE — H&P ADULT - HISTORY OF PRESENT ILLNESS
58F with hx of diet controlled HLD s/p R breast lumpectomy and bl breast reduction today post op s/p Dilaudid IV for pain control with complaints of sensation of throat closing and SOB. Similar sxs over 20 yrs ago when she had myomectomy and was on Morphine and has since avoided Morphine. Last recent surgery with partial hystere 58F with hx of diet controlled HLD s/p R breast lumpectomy and bl breast reduction today post op s/p Dilaudid IV for pain control with complaints of sensation of throat closing and SOB and unable to take in a deep breath easily.  No tongue swelling or itch. No chest pain. No rash. Sats were noted to be 100% throughout. s/p IV decadron 10mg x 1 and duoneb and already improving. Similar sxs over 20 yrs ago when she had myomectomy and was on Morphine and has since avoided Morphine. Last recent surgery with partial hysterectomy with no issues with IV dilaudid or oral oxycodone.

## 2023-12-19 NOTE — PROVIDER CONTACT NOTE (OTHER) - ASSESSMENT
Breath sounds clear, no croup or rhonchi. patient is able to tolerate 1 cup of tea and felt better after. no chest retractions, skin warm and dry to touch. O2 sat 100% on room air.

## 2023-12-19 NOTE — BRIEF OPERATIVE NOTE - NSICDXBRIEFPOSTOP_GEN_ALL_CORE_FT
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  
POST-OP DIAGNOSIS:  History of breast cancer 19-Dec-2023 15:23:58  Ivette Boss

## 2023-12-19 NOTE — PROVIDER CONTACT NOTE (OTHER) - SITUATION
patient patient is awake, alert and oriented X4 complained  of dyspnea and sensation of "closing of the throat" with O2 sat 100 % on room air. repeated complaint of closing up airway stated by patient @ 2015

## 2023-12-19 NOTE — BRIEF OPERATIVE NOTE - NSICDXBRIEFPROCEDURE_GEN_ALL_CORE_FT
PROCEDURES:  Reduction, breast, using oncoplastic surgery 19-Dec-2023 15:23:31  Ivette Boss  
PROCEDURES:  Lumpectomy, breast, right 19-Dec-2023 16:57:58  Yesenia Gongora

## 2023-12-20 ENCOUNTER — TRANSCRIPTION ENCOUNTER (OUTPATIENT)
Age: 58
End: 2023-12-20

## 2023-12-20 VITALS
RESPIRATION RATE: 16 BRPM | TEMPERATURE: 98 F | SYSTOLIC BLOOD PRESSURE: 115 MMHG | OXYGEN SATURATION: 100 % | HEART RATE: 69 BPM | DIASTOLIC BLOOD PRESSURE: 57 MMHG

## 2023-12-20 PROCEDURE — 88305 TISSUE EXAM BY PATHOLOGIST: CPT | Mod: 26

## 2023-12-20 PROCEDURE — 76098 X-RAY EXAM SURGICAL SPECIMEN: CPT

## 2023-12-20 PROCEDURE — 88307 TISSUE EXAM BY PATHOLOGIST: CPT

## 2023-12-20 PROCEDURE — 88342 IMHCHEM/IMCYTCHM 1ST ANTB: CPT

## 2023-12-20 PROCEDURE — 88342 IMHCHEM/IMCYTCHM 1ST ANTB: CPT | Mod: 26

## 2023-12-20 PROCEDURE — C9399: CPT

## 2023-12-20 PROCEDURE — 88305 TISSUE EXAM BY PATHOLOGIST: CPT

## 2023-12-20 PROCEDURE — 19301 PARTIAL MASTECTOMY: CPT | Mod: RT

## 2023-12-20 PROCEDURE — 19318 BREAST REDUCTION: CPT | Mod: LT

## 2023-12-20 PROCEDURE — 99204 OFFICE O/P NEW MOD 45 MIN: CPT

## 2023-12-20 PROCEDURE — 88307 TISSUE EXAM BY PATHOLOGIST: CPT | Mod: 26

## 2023-12-20 RX ORDER — OXYCODONE HYDROCHLORIDE 5 MG/1
10 TABLET ORAL EVERY 6 HOURS
Refills: 0 | Status: DISCONTINUED | OUTPATIENT
Start: 2023-12-20 | End: 2023-12-20

## 2023-12-20 RX ORDER — OXYCODONE HYDROCHLORIDE 5 MG/1
5 TABLET ORAL EVERY 6 HOURS
Refills: 0 | Status: DISCONTINUED | OUTPATIENT
Start: 2023-12-20 | End: 2023-12-20

## 2023-12-20 RX ORDER — ACETAMINOPHEN 500 MG
650 TABLET ORAL EVERY 6 HOURS
Refills: 0 | Status: DISCONTINUED | OUTPATIENT
Start: 2023-12-20 | End: 2024-01-02

## 2023-12-20 RX ORDER — IPRATROPIUM/ALBUTEROL SULFATE 18-103MCG
3 AEROSOL WITH ADAPTER (GRAM) INHALATION EVERY 6 HOURS
Refills: 0 | Status: DISCONTINUED | OUTPATIENT
Start: 2023-12-20 | End: 2024-01-02

## 2023-12-20 RX ADMIN — OXYCODONE HYDROCHLORIDE 10 MILLIGRAM(S): 5 TABLET ORAL at 08:49

## 2023-12-20 RX ADMIN — OXYCODONE HYDROCHLORIDE 10 MILLIGRAM(S): 5 TABLET ORAL at 09:30

## 2023-12-20 NOTE — DISCHARGE NOTE PROVIDER - NSDCFUADDINST_GEN_ALL_CORE_FT
Follow instructions given to you by the surgeon  Drain care empty twice daily and record color and amount  Medication have been prescribed to you by your surgeons office

## 2023-12-20 NOTE — DISCHARGE NOTE PROVIDER - PROVIDER TOKENS
PROVIDER:[TOKEN:[[id number]],FOLLOWUP:[1 week]] PROVIDER:[TOKEN:[[id number]],FOLLOWUP:[1 week]]

## 2023-12-20 NOTE — DISCHARGE NOTE NURSING/CASE MANAGEMENT/SOCIAL WORK - NSDCPEFALRISK_GEN_ALL_CORE
For information on Fall & Injury Prevention, visit: [email or web address].[email or web address]/[email or web address]-prevention-protects-[email or web address]-maintains-health-and-mobility OR  https://www.[facility]/news/fall-prevention-tips-[email or web address]-injury OR  https://www.cdc.gov/steadi/patient.html For information on Fall & Injury Prevention, visit: https://www.[facility]/news/fall-prevention-protects-and-maintains-health-and-mobility OR  https://www.[facility]/news/fall-prevention-tips-[email or web address]-injury OR  https://www.cdc.gov/steadi/patient.html

## 2023-12-20 NOTE — PROGRESS NOTE ADULT - SUBJECTIVE AND OBJECTIVE BOX
Patient is a 58y old  Female who presents with a chief complaint of SOB (20 Dec 2023 09:11)      Patient seen and examined at bedside.    ALLERGIES:  morphine (Short breath)  penicillin (Other)    MEDICATIONS  (STANDING):    MEDICATIONS  (PRN):  acetaminophen     Tablet .. 650 milliGRAM(s) Oral every 6 hours PRN Temp greater or equal to 38C (100.4F), Mild Pain (1 - 3)  albuterol/ipratropium for Nebulization 3 milliLiter(s) Nebulizer every 6 hours PRN Bronchospasm  oxyCODONE    IR 5 milliGRAM(s) Oral every 6 hours PRN Moderate Pain (4 - 6)  oxyCODONE    IR 10 milliGRAM(s) Oral every 6 hours PRN Severe Pain (7 - 10)    Vital Signs Last 24 Hrs  T(F): 97.8 (19 Dec 2023 23:00), Max: 97.8 (19 Dec 2023 23:00)  HR: 72 (19 Dec 2023 23:00) (63 - 83)  BP: 112/68 (19 Dec 2023 23:00) (105/58 - 136/74)  RR: 16 (19 Dec 2023 23:00) (12 - 23)  SpO2: 100% (19 Dec 2023 23:00) (99% - 100%)  I&O's Summary    19 Dec 2023 07:01  -  20 Dec 2023 07:00  --------------------------------------------------------  IN: 75 mL / OUT: 117 mL / NET: -42 mL      BMI (kg/m2): 36.1 (12-19-23 @ 07:26)  PHYSICAL EXAM:  General: NAD, A/O x 3  ENT: MMM, no scleral icterus  Neck: Supple, No JVD, no thyroidomegaly  Lungs: Clear to auscultation bilaterally, no wheezes, no rales, no rhonchi, good inspiratory effort  Cardio: RRR, S1/S2, No murmurs  Abdomen: Soft, Nontender, Nondistended; Bowel sounds present  Extremities: No calf tenderness, No pitting edema, no skin changes    LABS:                                                   Patient is a 58y old  Female who presents with a chief complaint of SOB (20 Dec 2023 09:11)      Patient seen and examined at bedside.    ALLERGIES:  morphine (Short breath)  penicillin (Other)    MEDICATIONS  (STANDING):    MEDICATIONS  (PRN):  acetaminophen     Tablet .. 650 milliGRAM(s) Oral every 6 hours PRN Temp greater or equal to 38C (100.4F), Mild Pain (1 - 3)  albuterol/ipratropium for Nebulization 3 milliLiter(s) Nebulizer every 6 hours PRN Bronchospasm  oxyCODONE    IR 5 milliGRAM(s) Oral every 6 hours PRN Moderate Pain (4 - 6)  oxyCODONE    IR 10 milliGRAM(s) Oral every 6 hours PRN Severe Pain (7 - 10)    Vital Signs Last 24 Hrs  T(F): 97.8 (19 Dec 2023 23:00), Max: 97.8 (19 Dec 2023 23:00)  HR: 72 (19 Dec 2023 23:00) (63 - 83)  BP: 112/68 (19 Dec 2023 23:00) (105/58 - 136/74)  RR: 16 (19 Dec 2023 23:00) (12 - 23)  SpO2: 100% (19 Dec 2023 23:00) (99% - 100%)  I&O's Summary    19 Dec 2023 07:01  -  20 Dec 2023 07:00  --------------------------------------------------------  IN: 75 mL / OUT: 117 mL / NET: -42 mL      BMI (kg/m2): 36.1 (12-19-23 @ 07:26)  PHYSICAL EXAM:  General: NAD, A/O x 3  ENT: MMM, no scleral icterus  Neck: Supple, No JVD, no thyroidomegaly  Lungs: Clear to auscultation bilaterally, no wheezes, no rales, no rhonchi, good inspiratory effort  Cardio: RRR, S1/S2, No murmurs  Abdomen: Soft, Nontender, Nondistended; Bowel sounds present  Extremities: No calf tenderness, No pitting edema, no skin changes    LABS:

## 2023-12-20 NOTE — DISCHARGE NOTE PROVIDER - CARE PROVIDER_API CALL
Ye Chavarria  Plastic Surgery  [address], [address]  [place] [address]  Phone: [phone]  Fax: [phone]  Follow Up Time: 1 week   Ye Chavarria  Plastic Surgery  [address], [address]  [place] [address]  Phone: [phone]  Fax: [phone]  Follow Up Time: 1 week

## 2023-12-20 NOTE — DISCHARGE NOTE NURSING/CASE MANAGEMENT/SOCIAL WORK - NSSCNAMETXT_GEN_ALL_CORE
[facility]

## 2023-12-20 NOTE — DISCHARGE NOTE PROVIDER - NSDCMRMEDTOKEN_GEN_ALL_CORE_FT
ketoconazole 2% topical shampoo: Apply topically to affected area once a week  latanoprost 0.005% ophthalmic solution: 1 drop(s) to each affected eye once a day (in the evening)  sulfacetamide sodium 10% topical soap: Apply topically to affected area 2 times a day

## 2023-12-20 NOTE — PROGRESS NOTE ADULT - ASSESSMENT
58F with hx of diet controlled HLD s/p R breast lumpectomy and bl breast reduction today post op s/p Dilaudid IV for pain control with complaints of sensation of throat closing and SOB    #Transient episode of sensation of throat sxs/SOB ? sec to IV dilaudid vs post extub irritation   but already resolving s/p Decadron and duoneb.   prn duoneb.   monitor overnight.   continue tramadol prn for pain   58F with hx of diet controlled HLD s/p R breast lumpectomy and bl breast reduction today post op s/p Dilaudid IV for pain control with complaints of sensation of throat closing and SOB    #Transient episode of sensation of throat sxs/SOB ? sec to IV dilaudid vs post extub irritation   Resolved; s/p Decadron and duoneb.   prn duoneb.     Stable for discharge home at this time  Will sign off   Thank you

## 2023-12-20 NOTE — DISCHARGE NOTE NURSING/CASE MANAGEMENT/SOCIAL WORK - PATIENT PORTAL LINK FT
You can access the FollowMyHealth Patient Portal offered by  by registering at the following website: [email or web address]. By joining [employer]’s FollowMyHealth portal, you will also be able to view your health information using other applications (apps) compatible with our system. You can access the FollowMyHealth Patient Portal offered by [facility] by registering at the following website: [email or web address]. By joining [employer]’s FollowMyHealth portal, you will also be able to view your health information using other applications (apps) compatible with our system.

## 2023-12-20 NOTE — DISCHARGE NOTE PROVIDER - HOSPITAL COURSE
59 y/o female with PMHX of anemia who presents to PST with pre-operative diagnosis of lesion in right breast. Lesion noted in right breast on routine mammogram in 9/2023. Biopsy done and revealed atypical cells, per pt. POD 1 Lumpectomy, breast, right and plastic Reduction, breast, using oncoplastic surgery 12/20/23. Pt stayed overnight for pain control and with complaints of sensation of throat closing and SOB and unable to take in a deep breath easily.  No tongue swelling or itch. No chest pain. No rash. Saturations were noted to be 100% throughout. Received IV decadron 10mg x 1 and duoneb and improved. This morning she was comfortable, no sob, pain controlled with pain medication. Denied any dizziness, chest pain, n/v.   Discussed case with surgeon and pt deemed stable for Discharge.

## 2024-01-05 LAB
SURGICAL PATHOLOGY STUDY: SIGNIFICANT CHANGE UP
SURGICAL PATHOLOGY STUDY: SIGNIFICANT CHANGE UP

## 2024-01-12 NOTE — PATIENT PROFILE ADULT - FALL HARM RISK - TYPE OF ASSESSMENT
Patient: Sakina Bowling    Procedure: Procedure(s):  HYSTERECTOMY, TOTAL, ROBOT-ASSISTED, WITH BILATERAL SALPINGECTOMY, CYSTOSCOPY       Diagnosis: Uterine leiomyoma, unspecified location [D25.9]  Diagnosis Additional Information: No value filed.    Anesthesia Type:   General     Note:    Oropharynx: oropharynx clear of all foreign objects  Level of Consciousness: drowsy  Oxygen Supplementation: blow-by O2  Level of Supplemental Oxygen (L/min / FiO2): 6  Independent Airway: airway patency satisfactory and stable  Dentition: dentition unchanged  Vital Signs Stable: post-procedure vital signs reviewed and stable  Report to RN Given: handoff report given  Patient transferred to: PACU    Handoff Report: Identifed the Patient, Identified the Reponsible Provider, Reviewed the pertinent medical history, Discussed the surgical course, Reviewed Intra-OP anesthesia mangement and issues during anesthesia, Set expectations for post-procedure period and Allowed opportunity for questions and acknowledgement of understanding      Vitals:  Vitals Value Taken Time   BP     Temp     Pulse     Resp     SpO2         Electronically Signed By: HERBERT Magana CRNA  January 12, 2024  12:29 PM   Admission

## 2024-01-16 ENCOUNTER — OUTPATIENT (OUTPATIENT)
Dept: OUTPATIENT SERVICES | Facility: HOSPITAL | Age: 59
LOS: 1 days | Discharge: HOME | End: 2024-01-16
Payer: COMMERCIAL

## 2024-01-16 DIAGNOSIS — Z98.890 OTHER SPECIFIED POSTPROCEDURAL STATES: Chronic | ICD-10-CM

## 2024-01-16 DIAGNOSIS — Z92.89 PERSONAL HISTORY OF OTHER MEDICAL TREATMENT: Chronic | ICD-10-CM

## 2024-01-16 DIAGNOSIS — D05.00 LOBULAR CARCINOMA IN SITU OF UNSPECIFIED BREAST: ICD-10-CM

## 2024-01-16 DIAGNOSIS — Z90.710 ACQUIRED ABSENCE OF BOTH CERVIX AND UTERUS: Chronic | ICD-10-CM

## 2024-01-18 ENCOUNTER — RESULT REVIEW (OUTPATIENT)
Age: 59
End: 2024-01-18

## 2024-01-18 LAB — SURGICAL PATHOLOGY STUDY: SIGNIFICANT CHANGE UP

## 2024-01-19 ENCOUNTER — NON-APPOINTMENT (OUTPATIENT)
Age: 59
End: 2024-01-19

## 2024-01-19 ENCOUNTER — APPOINTMENT (OUTPATIENT)
Dept: HEMATOLOGY ONCOLOGY | Facility: CLINIC | Age: 59
End: 2024-01-19
Payer: COMMERCIAL

## 2024-01-19 VITALS
HEART RATE: 70 BPM | HEIGHT: 62.2 IN | TEMPERATURE: 97.5 F | SYSTOLIC BLOOD PRESSURE: 150 MMHG | DIASTOLIC BLOOD PRESSURE: 80 MMHG | WEIGHT: 207.23 LBS | RESPIRATION RATE: 16 BRPM | OXYGEN SATURATION: 99 % | BODY MASS INDEX: 37.65 KG/M2

## 2024-01-19 DIAGNOSIS — N60.91 UNSPECIFIED BENIGN MAMMARY DYSPLASIA OF RIGHT BREAST: ICD-10-CM

## 2024-01-19 PROCEDURE — 99204 OFFICE O/P NEW MOD 45 MIN: CPT

## 2024-01-19 RX ORDER — ASPIRIN 325 MG/1
325 TABLET, FILM COATED ORAL
Refills: 0 | Status: DISCONTINUED | COMMUNITY
End: 2024-01-19

## 2024-01-19 RX ORDER — CHROMIUM 200 MCG
25 MCG TABLET ORAL
Refills: 0 | Status: ACTIVE | COMMUNITY
Start: 2024-01-19

## 2024-01-19 NOTE — ASSESSMENT
[FreeTextEntry1] : She is a 59 y/o F with R breast LCIS and atypical ductal hyperplasia s/p excision. We reviewed the significance of LCIS/ atypical hyperplasia and future risk of breast cancer: 20 to 25%. We reviewed standard of care recommendation of surveillance with breast imaging and breast exam. We reviewed the role of chemoprevention to lower the risk of future breast cancer by 50%. We reviewed with her perimenopausal state: tamoxifen would be best chemoprevention. We reviewed duration of therapy of up to 5 years. We reviewed potential side effects including but not limited to: tiredness, hot flashes, GI upset, joint pain/ stiffness and skin dryness. We reviewed her concerns of potential AE and her current tingling sensation. Reviewed less than 1% risk of blood clot. We reviewed mini-dose tamoxifen: 5 mg daily as per [place] study from [facility] 2018. We reviewed if she has intolerable AE, she can stop therapy. Questions answered to her satisfaction. She is agreeable with plan. Next follow up in 3 months but earlier if any new symptoms.

## 2024-01-19 NOTE — PHYSICAL EXAM
[Fully active, able to carry on all pre-disease performance without restriction] : Status 0 - Fully active, able to carry on all pre-disease performance without restriction [Normal] : affect appropriate [de-identified] : right breast excision and B breast reduction with scar sites healed

## 2024-01-19 NOTE — REASON FOR VISIT
[Initial Consultation] : an initial consultation [FreeTextEntry2] : Evaluation for LCIS and atypical ductal hyperplasia

## 2024-01-19 NOTE — CONSULT LETTER
[Dear  ___] : Dear  [unfilled], [Consult Letter:] : I had the pleasure of evaluating your patient, [unfilled]. [( Thank you for referring [unfilled] for consultation for _____ )] : Thank you for referring [unfilled] for consultation for [unfilled] [Please see my note below.] : Please see my note below. [Consult Closing:] : Thank you very much for allowing me to participate in the care of this patient.  If you have any questions, please do not hesitate to contact me. [Sincerely,] : Sincerely, [FreeTextEntry2] : Tila Johnson  [facility] [address]  [place] [address] [FreeTextEntry3] : Paras Sarabia MD Attending [facility]

## 2024-01-19 NOTE — REVIEW OF SYSTEMS
[Diarrhea: Grade 0] : Diarrhea: Grade 0 [Confused] : no confusion [Dizziness] : no dizziness [Fainting] : no fainting [Difficulty Walking] : no difficulty walking [de-identified] : tingling sensation over the head down to legs  [Negative] : Allergic/Immunologic

## 2024-01-19 NOTE — HISTORY OF PRESENT ILLNESS
[Disease: _____________________] : Disease: [unfilled] [AJCC Stage: ____] : AJCC Stage: [unfilled] [de-identified] : Age 58: LCIS and R atypical ductal hyperplasia  Screen detected: she had interval mammogram / sonogram done 9/1/2023 which showed heterogeneously dense breasts with calcifications in the right upper outer posterior breast. She had right breast upper posterior biopsy done 9/2023 which showed atypical ductal hyperplasia. She had R breast excision done on 12/20/2023 which showed lobular carcinoma in situ: classic type and L breast excision showing fibroproliferative changes.   [de-identified] : R LCIS and atypical ductal hyperplasia  [de-identified] : Genetic testing done with Dr Johnson: negative  [de-identified] : She is present to review chemoprevention. She had hysterectomy with GYN. She has been having more tingling sensation from head down to the toes. Had seen dermatology who thought she had dermatitis but no rash. She has been changing diet to lose weight. She was perimenopausal at the time of hysterectomy: still has ovaries.

## 2024-01-23 ENCOUNTER — RESULT REVIEW (OUTPATIENT)
Age: 59
End: 2024-01-23

## 2024-01-23 LAB — SURGICAL PATHOLOGY STUDY: SIGNIFICANT CHANGE UP

## 2024-01-23 PROCEDURE — 88321 CONSLTJ&REPRT SLD PREP ELSWR: CPT

## 2024-04-16 ENCOUNTER — OUTPATIENT (OUTPATIENT)
Dept: OUTPATIENT SERVICES | Facility: HOSPITAL | Age: 59
LOS: 1 days | Discharge: HOME | End: 2024-04-16

## 2024-04-16 DIAGNOSIS — Z98.890 OTHER SPECIFIED POSTPROCEDURAL STATES: Chronic | ICD-10-CM

## 2024-04-16 DIAGNOSIS — Z92.89 PERSONAL HISTORY OF OTHER MEDICAL TREATMENT: Chronic | ICD-10-CM

## 2024-04-16 DIAGNOSIS — D05.00 LOBULAR CARCINOMA IN SITU OF UNSPECIFIED BREAST: ICD-10-CM

## 2024-04-16 DIAGNOSIS — Z90.710 ACQUIRED ABSENCE OF BOTH CERVIX AND UTERUS: Chronic | ICD-10-CM

## 2024-04-19 ENCOUNTER — APPOINTMENT (OUTPATIENT)
Dept: HEMATOLOGY ONCOLOGY | Facility: CLINIC | Age: 59
End: 2024-04-19
Payer: COMMERCIAL

## 2024-04-19 VITALS
OXYGEN SATURATION: 98 % | SYSTOLIC BLOOD PRESSURE: 121 MMHG | HEART RATE: 100 BPM | BODY MASS INDEX: 38.18 KG/M2 | RESPIRATION RATE: 16 BRPM | WEIGHT: 210.1 LBS | TEMPERATURE: 97.1 F | DIASTOLIC BLOOD PRESSURE: 80 MMHG

## 2024-04-19 DIAGNOSIS — D05.00 LOBULAR CARCINOMA IN SITU OF UNSPECIFIED BREAST: ICD-10-CM

## 2024-04-19 PROCEDURE — 99213 OFFICE O/P EST LOW 20 MIN: CPT

## 2024-04-22 ENCOUNTER — NON-APPOINTMENT (OUTPATIENT)
Age: 59
End: 2024-04-22

## 2024-04-26 ENCOUNTER — NON-APPOINTMENT (OUTPATIENT)
Age: 59
End: 2024-04-26

## 2024-05-07 ENCOUNTER — RX RENEWAL (OUTPATIENT)
Age: 59
End: 2024-05-07

## 2024-05-07 RX ORDER — TAMOXIFEN CITRATE 20 MG/1
20 TABLET, FILM COATED ORAL DAILY
Qty: 90 | Refills: 0 | Status: ACTIVE | COMMUNITY
Start: 2024-05-07 | End: 1900-01-01

## 2024-05-07 RX ORDER — TAMOXIFEN CITRATE 10 MG/1
10 TABLET, FILM COATED ORAL
Qty: 30 | Refills: 0 | Status: DISCONTINUED | COMMUNITY
Start: 2024-01-19 | End: 2024-05-07

## 2024-05-08 LAB
HCT VFR BLD CALC: 33.5 %
HGB BLD-MCNC: 9.5 G/DL
MCHC RBC-ENTMCNC: 21.1 PG
MCHC RBC-ENTMCNC: 28.4 GM/DL
MCV RBC AUTO: 74.3 FL
PLATELET # BLD AUTO: 327 K/UL
RBC # BLD: 4.51 M/UL
RBC # FLD: 19.4 %
WBC # FLD AUTO: 3.56 K/UL

## 2024-10-21 ENCOUNTER — OUTPATIENT (OUTPATIENT)
Dept: OUTPATIENT SERVICES | Facility: HOSPITAL | Age: 59
LOS: 1 days | Discharge: HOME | End: 2024-10-21

## 2024-10-21 DIAGNOSIS — Z98.890 OTHER SPECIFIED POSTPROCEDURAL STATES: Chronic | ICD-10-CM

## 2024-10-21 DIAGNOSIS — Z90.710 ACQUIRED ABSENCE OF BOTH CERVIX AND UTERUS: Chronic | ICD-10-CM

## 2024-10-21 DIAGNOSIS — D05.00 LOBULAR CARCINOMA IN SITU OF UNSPECIFIED BREAST: ICD-10-CM

## 2024-10-21 DIAGNOSIS — Z92.89 PERSONAL HISTORY OF OTHER MEDICAL TREATMENT: Chronic | ICD-10-CM

## 2024-10-25 ENCOUNTER — NON-APPOINTMENT (OUTPATIENT)
Age: 59
End: 2024-10-25

## 2024-10-25 ENCOUNTER — APPOINTMENT (OUTPATIENT)
Dept: HEMATOLOGY ONCOLOGY | Facility: CLINIC | Age: 59
End: 2024-10-25
Payer: COMMERCIAL

## 2024-10-25 VITALS
HEART RATE: 69 BPM | DIASTOLIC BLOOD PRESSURE: 84 MMHG | OXYGEN SATURATION: 97 % | TEMPERATURE: 97.5 F | SYSTOLIC BLOOD PRESSURE: 154 MMHG | BODY MASS INDEX: 40.14 KG/M2 | WEIGHT: 220.9 LBS | RESPIRATION RATE: 16 BRPM

## 2024-10-25 DIAGNOSIS — N60.91 UNSPECIFIED BENIGN MAMMARY DYSPLASIA OF RIGHT BREAST: ICD-10-CM

## 2024-10-25 PROCEDURE — 99213 OFFICE O/P EST LOW 20 MIN: CPT

## 2024-10-25 PROCEDURE — G2211 COMPLEX E/M VISIT ADD ON: CPT | Mod: NC

## 2024-10-25 RX ORDER — TAMOXIFEN CITRATE 10 MG/1
10 TABLET, FILM COATED ORAL DAILY
Qty: 90 | Refills: 1 | Status: ACTIVE | COMMUNITY
Start: 2024-10-25 | End: 1900-01-01

## 2025-04-07 NOTE — H&P ADULT - NSHPREVIEWOFSYSTEMS_GEN_ALL_CORE
Assessment/Plan:  Calcium 0822-5010 mg (in divided doses-max 600 mg at one time) + 600-1000 IU Vit D daily.   Exercise 150-300 minutes per week minimum including weight bearing exercises.   DEXA due   Pap with high risk HPV Q 5 years, if normal.  Collected today.   Call your insurance company to verify coverage prior to completing any ordered tests.   Monthly breast self exam recommended.    Colonoscopy- referred to SIMI Platt 20 times twice daily.   Silicone based lubricant with sex. (Use water based lubricant with condoms or sexual toys.)  Vaginal moisturizers twice weekly as needed.   Consider caledndula ointment to rash under left axilla, as needed.   Return to office in one year or sooner, if needed.        1. Encntr for gyn exam (general) (routine) w/o abn findings  2. Encounter for Papanicolaou smear for cervical cancer screening  -     Liquid-based pap, screening  3. Screening for colon cancer  -     Ambulatory referral to Gastroenterology; Future  4. S/P bilateral mastectomy             Subjective:      Patient ID: Debra Escoto is a 59 y.o. female.    HPI    Debra Escoto is a 59 y.o.   female who is here today for her annual visit. No gynecologic health concerns.   /92. Asymptomatic.   Hx of left breast cancer diagnosed in 2019. Left mastectomy with sentinel node biopsy (positive) and right prophylactic mastectomy. Took tamoxifen x 3 years. BRCA negative. Follows with REJI GALDAMEZ.  3/3/25 right breast cellulitis and breast washout.   Last in office on 24 for annual exam.   LMP since 10/2019 with no vaginal bleeding.   She discontinued her arimidex due to side effects.   Exercise- 6 times per week.   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     Debra Escoto is sexually active with male partner/  of 21 years. Monogamous and feels safe in this relationship. Denies vaginal bleeding or  "dryness.  No longer has insertional dyspareunia. Does use a lubricant. Intermittent hot flashes but not interested in treatment. Weaning off lexapro.   She is not interested in STD screening today.   She denies vaginal discharge, itching or pelvic pain.   She has no urinary concerns, does not have incontinence.  No bowel concerns.  No chest  concerns.    Last pap: 02/24/2020 Normal pap with negative HR HPV   Mammogram: N/A  Colonoscopy: 07/20/2018 recall 5 years.   DEXA scan: 07/24/2024 osteopenia    Family history of cancer:   Cancer-related family history includes Prostate cancer (age of onset: 65) in her paternal uncle.      The following portions of the patient's history were reviewed and updated as appropriate: allergies, current medications, past family history, past medical history, past social history, past surgical history, and problem list.    Review of Systems   Constitutional: Negative.  Negative for activity change, appetite change, chills, diaphoresis, fatigue, fever and unexpected weight change.   HENT:  Negative for congestion, dental problem, sneezing, sore throat and trouble swallowing.    Eyes:  Negative for visual disturbance.   Respiratory:  Negative for chest tightness and shortness of breath.    Cardiovascular:  Negative for chest pain and leg swelling.   Gastrointestinal:  Negative for abdominal pain, constipation, diarrhea, nausea and vomiting.   Genitourinary:  Negative for difficulty urinating, dyspareunia, dysuria, frequency, hematuria, pelvic pain, urgency, vaginal bleeding, vaginal discharge and vaginal pain.   Musculoskeletal:  Negative for back pain and neck pain.   Skin: Negative.    Allergic/Immunologic: Negative.    Neurological:  Negative for weakness and headaches.   Hematological:  Negative for adenopathy.   Psychiatric/Behavioral: Negative.           Objective:      /92 (BP Location: Left arm, Patient Position: Sitting, Cuff Size: Standard)   Ht 5' 4.5\" (1.638 m)   Wt " 74.4 kg (164 lb)   LMP  (LMP Unknown)   BMI 27.72 kg/m²          Physical Exam  Vitals and nursing note reviewed.   Constitutional:       Appearance: Normal appearance. She is well-developed.   HENT:      Head: Normocephalic.   Neck:      Thyroid: No thyromegaly.   Cardiovascular:      Rate and Rhythm: Normal rate and regular rhythm.      Heart sounds: Normal heart sounds.   Pulmonary:      Effort: Pulmonary effort is normal.      Breath sounds: Normal breath sounds.   Chest:   Breasts:     Right: Normal. No mass, nipple discharge, skin change or tenderness.      Left: Normal. No mass, nipple discharge, skin change or tenderness.          Comments: Fine papular red rash noted. Has followed with derm.   Bilateral mastectomy  Abdominal:      Palpations: Abdomen is soft.   Genitourinary:     General: Normal vulva.      Exam position: Lithotomy position.      Labia:         Right: No rash, tenderness, lesion or injury.         Left: No rash, tenderness, lesion or injury.       Urethra: No prolapse, urethral pain, urethral swelling or urethral lesion.      Vagina: No signs of injury and foreign body. No vaginal discharge, erythema, tenderness, bleeding, lesions or prolapsed vaginal walls.      Cervix: Normal.      Uterus: Normal.       Adnexa: Right adnexa normal and left adnexa normal.        Right: No mass, tenderness or fullness.          Left: No mass, tenderness or fullness.        Rectum: No external hemorrhoid.      Comments: Vulvovaginal atrophy  Musculoskeletal:         General: Normal range of motion.      Cervical back: Normal range of motion.   Lymphadenopathy:      Head:      Right side of head: No submental, submandibular, tonsillar or occipital adenopathy.      Left side of head: No submental, submandibular, tonsillar or occipital adenopathy.      Upper Body:      Right upper body: No supraclavicular or axillary adenopathy.      Left upper body: No supraclavicular or axillary adenopathy.      Lower Body:  No right inguinal adenopathy. No left inguinal adenopathy.   Skin:     General: Skin is warm and dry.   Neurological:      Mental Status: She is alert and oriented to person, place, and time.   Psychiatric:         Mood and Affect: Mood normal.         Behavior: Behavior normal. Behavior is cooperative.          CONSTITUTIONAL: No fever, weight loss, or fatigue  EYES: No eye pain, visual disturbances, or discharge  ENMT:  No difficulty hearing, tinnitus, vertigo; No sinus or throat pain  NECK: No pain or stiffness  RESPIRATORY: No cough, wheezing, chills or hemoptysis; + shortness of breath as per HPI.   CARDIOVASCULAR: No chest pain, palpitations, dizziness, or leg swelling  GASTROINTESTINAL: No abdominal or epigastric pain. No nausea, vomiting, or hematemesis; No diarrhea or constipation. No melena or hematochezia.  GENITOURINARY: No dysuria, frequency, hematuria, or incontinence  NEUROLOGICAL: No headaches, memory loss, loss of strength, numbness, or tremors  SKIN: No itching, burning, rashes, or lesions   LYMPH NODES: No enlarged glands  ENDOCRINE: No heat or cold intolerance; No hair loss  MUSCULOSKELETAL: No joint pain or swelling; No muscle, back, or extremity pain  PSYCHIATRIC: No depression, anxiety, mood swings, or difficulty sleeping  HEME/LYMPH: No easy bruising, or bleeding gums  ALLERY AND IMMUNOLOGIC: No hives or eczema    IMPROVE VTE Individual Risk Assessment          RISK                                                          Points  [  ] Previous VTE                                                3  [  ] Thrombophilia                                             2  [  ] Lower limb paralysis                                   2        (unable to hold up >15 seconds)    [  ] Current Cancer                                             2         (within 6 months)  [  ] Immobilization > 24 hrs                              1  [  ] ICU/CCU stay > 24 hours                             1  [  ] Age > 60                                                         1    IMPROVE VTE Score:         [    0   ]    Total Risk Factor Score:    0 - 1:   Consider IPC  >2 - 3:  Thromboprophylaxis required (enoxaparin or SQ heparin)        >4:   High Risk: Thromboprophylaxis required (enoxaparin or SQ heparin), optional add IPC  **If CONTRAINDICATION to enoxaparin or SQ heparin, USE IPCs**

## 2025-04-18 ENCOUNTER — OUTPATIENT (OUTPATIENT)
Dept: OUTPATIENT SERVICES | Facility: HOSPITAL | Age: 60
LOS: 1 days | Discharge: HOME | End: 2025-04-18

## 2025-04-18 DIAGNOSIS — Z92.89 PERSONAL HISTORY OF OTHER MEDICAL TREATMENT: Chronic | ICD-10-CM

## 2025-04-18 DIAGNOSIS — Z90.710 ACQUIRED ABSENCE OF BOTH CERVIX AND UTERUS: Chronic | ICD-10-CM

## 2025-04-18 DIAGNOSIS — Z98.890 OTHER SPECIFIED POSTPROCEDURAL STATES: Chronic | ICD-10-CM

## 2025-04-18 DIAGNOSIS — D05.00 LOBULAR CARCINOMA IN SITU OF UNSPECIFIED BREAST: ICD-10-CM

## 2025-04-21 ENCOUNTER — APPOINTMENT (OUTPATIENT)
Dept: HEMATOLOGY ONCOLOGY | Facility: CLINIC | Age: 60
End: 2025-04-21
Payer: COMMERCIAL

## 2025-04-21 VITALS
DIASTOLIC BLOOD PRESSURE: 76 MMHG | HEART RATE: 63 BPM | TEMPERATURE: 97.2 F | OXYGEN SATURATION: 99 % | SYSTOLIC BLOOD PRESSURE: 120 MMHG | WEIGHT: 219.58 LBS | RESPIRATION RATE: 16 BRPM | BODY MASS INDEX: 39.9 KG/M2

## 2025-04-21 DIAGNOSIS — D05.00 LOBULAR CARCINOMA IN SITU OF UNSPECIFIED BREAST: ICD-10-CM

## 2025-04-21 DIAGNOSIS — N60.91 UNSPECIFIED BENIGN MAMMARY DYSPLASIA OF RIGHT BREAST: ICD-10-CM

## 2025-04-21 PROCEDURE — 99213 OFFICE O/P EST LOW 20 MIN: CPT

## 2025-07-21 NOTE — ASU PATIENT PROFILE, ADULT - NS PRO LACT YNNA
----- Message from Gricelda STAHL sent at 7/21/2025 11:25 AM EDT -----  Regarding: Specialty Message to Provider  Specialty Message to Provider    Relationship to Patient: Self     Patient Message: PATIENT IS CALLING IN REQUESTING A CALL BACK. HE NEEDS TO BE REFERRED TO SOMEONE ELSE FOR BACK. HE CALLED TO SCHEDULE AN APPOINTMENT TO [facility] HE WAS REFERRED TO AND THEY DO NOT ACCEPT WORKERS COMP.  --------------------------------------------------------------------------------------------------------------------------    Call Back Information: OK to leave message on voicemail  Preferred Call Back Number: Phone [phone]   no

## (undated) DEVICE — DRSG SURGICAL BRA XL 40-42

## (undated) DEVICE — POSITIONER FOAM HEAD CRADLE (PINK)

## (undated) DEVICE — TUBING IRR SET FOR CYSTOSCOPY 77"

## (undated) DEVICE — DRSG STERISTRIPS 0.5 X 4"

## (undated) DEVICE — SUT MONOCRYL 3-0 27" PS-2 UNDYED

## (undated) DEVICE — DRSG PAD SANITARY OB

## (undated) DEVICE — STAPLER SKIN VISI-STAT 35 WIDE

## (undated) DEVICE — SYR LUER LOK 10CC

## (undated) DEVICE — DRSG DERMABOND 0.7ML

## (undated) DEVICE — ELCTR BOVIE PENCIL SMOKE EVACUATION

## (undated) DEVICE — VENODYNE/SCD SLEEVE CALF MEDIUM

## (undated) DEVICE — SUT PDS II 1 27" CT

## (undated) DEVICE — SAVI SCOUT HANDPIECE

## (undated) DEVICE — PACK PERI GYN

## (undated) DEVICE — POSITIONER STRAP ARMBOARD VELCRO TS-30

## (undated) DEVICE — PACK PROCEDURAL TRAY D & C

## (undated) DEVICE — DRAPE LIGHT HANDLE COVER (GREEN)

## (undated) DEVICE — DRSG COMBINE 5X9"

## (undated) DEVICE — CANISTER DISPOSABLE THIN WALL 3000CC

## (undated) DEVICE — LAP PAD 18 X 18"

## (undated) DEVICE — PROTECTOR HEEL / ELBOW FLUFFY

## (undated) DEVICE — GLV 6.5 PROTEXIS (CREAM) MICRO

## (undated) DEVICE — PREP BETADINE SPONGE STICKS

## (undated) DEVICE — SUT MONOCRYL 4-0 27" PS-2 UNDYED

## (undated) DEVICE — DRAPE IRRIGATION POUCH 19X23"

## (undated) DEVICE — DRAIN RESERVOIR EVACUATOR 100CC BARD

## (undated) DEVICE — SYR LUER LOK 50CC

## (undated) DEVICE — PRESSURE INFUSOR BAG 1000ML

## (undated) DEVICE — Device

## (undated) DEVICE — SOL IRR POUR NS 0.9% 500ML

## (undated) DEVICE — DRSG CURITY GAUZE SPONGE 4 X 4" 12-PLY

## (undated) DEVICE — LIGASURE IMPACT

## (undated) DEVICE — CANISTER SUCTION LID GUARD 3000CC

## (undated) DEVICE — POSITIONER FOAM EGG CRATE ULNAR 2PCS (PINK)

## (undated) DEVICE — PACK MAJOR ABDOMINAL WITH LAP

## (undated) DEVICE — GLV 7 PROTEXIS (CREAM) MICRO

## (undated) DEVICE — DRAPE FLUID WARMER 44 X 44"

## (undated) DEVICE — GLV 6.5 PROTEXIS (WHITE)

## (undated) DEVICE — DRAPE TOWEL BLUE 17" X 24"

## (undated) DEVICE — LIJ/LIA-EVICEL PUMP: Type: DURABLE MEDICAL EQUIPMENT

## (undated) DEVICE — SUT VICRYL 0 18" CT-1 UNDYED (POP-OFF)

## (undated) DEVICE — TRAP SPECIMEN SPUTUM 40CC

## (undated) DEVICE — TUBING SUCTION NONCONDUCTIVE 6MM X 12FT

## (undated) DEVICE — SUT PLAIN GUT FAST ABSORBING 5-0 PC-1

## (undated) DEVICE — SUT VICRYL 0 36" CT-1 UNDYED

## (undated) DEVICE — WARMING BLANKET LOWER ADULT

## (undated) DEVICE — SOL IRR BAG NS 0.9% 1000ML

## (undated) DEVICE — SOL IRR POUR H2O 1500ML

## (undated) DEVICE — BLADE SCALPEL SAFETYLOCK #10

## (undated) DEVICE — GLV 7 PROTEXIS (WHITE)

## (undated) DEVICE — DRAPE IOBAN 23" X 23"

## (undated) DEVICE — VISITEC 4X4

## (undated) DEVICE — GLV 8 PROTEXIS (WHITE)

## (undated) DEVICE — SOL IRR POUR H2O 500ML

## (undated) DEVICE — WARMING BLANKET UPPER ADULT

## (undated) DEVICE — GOWN LG

## (undated) DEVICE — ELCTR GROUNDING PAD ADULT COVIDIEN

## (undated) DEVICE — BLADE SCALPEL SAFETYLOCK #15

## (undated) DEVICE — SUT VICRYL 0 27" CT-1 UNDYED

## (undated) DEVICE — GLV 7.5 PROTEXIS (WHITE)

## (undated) DEVICE — SPECIMEN CONTAINER PET

## (undated) DEVICE — POSITIONER PINK PAD PIGAZZI SYSTEM

## (undated) DEVICE — LABELS BLANK W PEN

## (undated) DEVICE — DRSG TELFA 3 X 8

## (undated) DEVICE — DRSG TEGADERM 4X4.75"

## (undated) DEVICE — DRAPE 1/2 SHEET 40X57"

## (undated) DEVICE — DRSG KLING 6"

## (undated) DEVICE — SYR LUER LOK 20CC

## (undated) DEVICE — BOWL STERILE 32OZ BLUE

## (undated) DEVICE — SUT POLYSORB 0 36" GS-21 UNDYED

## (undated) DEVICE — SUT VICRYL 0 18" TIES UNDYED

## (undated) DEVICE — SOL IRR POUR NS 0.9% 1000ML

## (undated) DEVICE — MYOSURE SCOPE SEAL

## (undated) DEVICE — SUT POLYSORB 2-0 30" V-20 UNDYED

## (undated) DEVICE — PACK MINOR

## (undated) DEVICE — SOL IRR POUR NS 0.9% 1500ML

## (undated) DEVICE — FOLEY TRAY 16FR 5CC LF UMETER CLOSED

## (undated) DEVICE — DRAPE SPLIT SHEET 77" X 108"

## (undated) DEVICE — ELCTR BOVIE TIP BLADE INSULATED 6.5" EDGE

## (undated) DEVICE — DRAPE 3/4 SHEET 52X76"

## (undated) DEVICE — SOLIDIFIER CANN EXPRESS 3K